# Patient Record
Sex: FEMALE | Race: WHITE | Employment: OTHER | URBAN - METROPOLITAN AREA
[De-identification: names, ages, dates, MRNs, and addresses within clinical notes are randomized per-mention and may not be internally consistent; named-entity substitution may affect disease eponyms.]

---

## 2017-04-22 ENCOUNTER — TELEPHONE (OUTPATIENT)
Dept: FAMILY MEDICINE | Facility: CLINIC | Age: 37
End: 2017-04-22

## 2017-04-22 NOTE — TELEPHONE ENCOUNTER
Panel Management Review      Patient has the following on her problem list:     Asthma review     ACT Total Scores 8/29/2016   ACT TOTAL SCORE (Goal Greater than or Equal to 20) 9   In the past 12 months, how many times did you visit the emergency room for your asthma without being admitted to the hospital? 0   In the past 12 months, how many times were you hospitalized overnight because of your asthma? 0      1. Is Asthma diagnosis on the Problem List? Yes    2. Is Asthma listed on Health Maintenance? Yes    3. Patient is due for:  ACT and AAP      Composite cancer screening  Chart review shows that this patient is due/due soon for the following Pap Smear  Summary:    Patient is due/failing the following:   AAP, ACT, PAP and PHYSICAL    Action needed:   Patient needs office visit for Physical with pap,     Type of outreach:    Phone, left message for patient to call back.     Questions for provider review:    None                                                                                                                                    Theodore Chery CMA       Chart routed to Care Team .

## 2017-04-22 NOTE — LETTER
[facility]  [address] [address]  [phone]  May 5, 2017    Tosha Mcnair  [address] [address] [address]    Dear Tosha,    I care about your health and have reviewed your health plan. I have reviewed your medical conditions, medication list, and lab results and am making recommendations based on this review, to better manage your health.    You are in particular need of attention regarding:  -Asthma  -Cervical Cancer Screening    I am recommending that you:  :-schedule a WELLNESS (Physical) APPOINTMENT with me.   I will check fasting labs the same day - nothing to eat except water and meds for 8-10 hours prior.  -schedule a PAP SMEAR EXAM which is due.  Please disregard this reminder if you have had this exam elsewhere within the last year.  It would be helpful for us to have a copy of your recent pap smear report in our file so that we can best coordinate your care.    Here is a list of Health Maintenance topics that are due now or due soon:  Health Maintenance Due   Topic Date Due     ASTHMA ACTION PLAN Q1 YR (NO INBASKET)  02/20/1984     PAP SCREENING Q3 YR (SYSTEM ASSIGNED)  02/20/2000     TETANUS IMMUNIZATION (SYSTEM ASSIGNED)  09/01/2016     ASTHMA CONTROL TEST Q6 MOS (NO INBASKET)  02/28/2017       Please call us at [phone] (or use [employer]) to address the above recommendations.     Thank you for trusting [facility] and we appreciate the opportunity to serve you.  We look forward to supporting your healthcare needs in the future.    Healthy Regards,  Lady Garcia MD

## 2017-05-05 NOTE — TELEPHONE ENCOUNTER
Tried calling patient, phone is not accepting calls. Will send letter.  Theodore Chery [facility]

## 2017-06-28 ENCOUNTER — NURSE TRIAGE (OUTPATIENT)
Dept: NURSING | Facility: CLINIC | Age: 37
End: 2017-06-28

## 2017-06-28 NOTE — TELEPHONE ENCOUNTER
Additional Information    Negative: Shock suspected (e.g., cold/pale/clammy skin, too weak to stand, low BP, rapid pulse)    Negative: Passed out (i.e., lost consciousness, collapsed and was not responding)    Negative: Sounds like a life-threatening emergency to the triager    Negative: Menstrual cramps is main concern    Negative: Abdominal (stomach) pain is main concern    Negative: Vulvar (external genital area) pain or symptoms (e.g., dryness, sores, redness, blisters, bumps) is main concern    Negative: Rectal pain is main concern    Negative: Hip pain is main concern    Negative: Urination pain is main concern (pain with passing urine)    Negative: [1] Pelvic pain AND [2] pregnant < 20 weeks    Negative: [1] Pelvic pain AND [2] pregnant > 20 weeks    Negative: [1] Pelvic pain AND [2] postpartum < 1 month since delivery    Negative: Pain associated with known hernia or new-onset hernia suspected (reducible bulge in groin/abdomen)    Negative: Followed an abdomen (stomach) injury    Negative: Followed a genital area injury    Negative: [1] SEVERE pelvic pain (e.g., excruciating) AND [2] vomiting    Negative: [1] SEVERE pelvic pain AND [2] present > 1 hour    Negative: SEVERE vaginal bleeding (i.e., soaking 2 pads or tampons per hour and present 2 or more hours)    Negative: Patient sounds very sick or weak to the triager    Negative: [1] MILD-MODERATE pain AND [2] constant AND [3] present > 2 hours    Negative: Fever > 103 F (39.4 C)    Negative: [1] Fever > 101 F (38.3 C) AND [2] age > 60    Negative: [1] Fever > 101 F (38.3 C) AND [2] bedridden (e.g., nursing home patient, CVA,chronic illness, recovering from surgery)    Negative: [1] Fever > 100.5 F (38.1 C) AND [2] diabetes mellitus or weak immune system(e.g., HIV positive, cancer chemo, splenectomy, organ transplant, chronicsteroids)    Negative: [1] MODERATE (e.g., interferes with normal activities) pelvic pain AND [2] pain comes and goes (cramps) AND  [3] present > 24 hours    Negative: [1] MILD (e.g., does not interfere with normal activities) pelvic pain AND [2] pain comes and goes (cramps) AND [3] present > 48 hours    Negative: Pregnancy suspected (e.g., missed last menstrual period)    Negative: Unusual vaginal discharge (e.g., bad smelling, yellow, green, or foamy-white)    Negative: Blood in urine (red, pink, or tea-colored)    Negative: [1] Pain with sexual intercourse (dyspareunia) AND [2] new onset (in past 4 weeks)    Negative: Patient is worried about sexually transmitted disease (STD / STI)    Negative: [1] Pelvic pain is a chronic symptom (recurrent or ongoing AND [2] present > 4 weeks)    Negative: [1] Pain with sexual intercourse (dyspareunia)  is a chronic symptom (recurrent or ongoing AND [2] present > 4 weeks)    Negative: [1] MILD-MODERATE pelvic pain AND [2] occurs in the middle of menstrual cycle (2 weeks after first day of period) (all triage questions negative)    [1] MILD-MODERATE pelvic pain AND [2] constant and [3] present < 2 hours (all triage questions negative)    Protocols used: PELVIC PAIN - FEMALE-ADULT-

## 2017-08-03 ENCOUNTER — TELEPHONE (OUTPATIENT)
Dept: FAMILY MEDICINE | Facility: CLINIC | Age: 37
End: 2017-08-03

## 2017-08-03 NOTE — LETTER
[facility]  [address] [address]  [phone]  August 3, 2017    Tosha Mcnair  [address] [address] [address]    Dear Tosha,    I care about your health and have reviewed your health plan. I have reviewed your medical conditions, medication list, and lab results and am making recommendations based on this review, to better manage your health.    You are in particular need of attention regarding:  -Asthma    I am recommending that you:  {recommendations: -Complete and return the attached ASTHMA CONTROL TEST.  If your total score is 19 or less or you have been to the ER or urgent care for your asthma, then please schedule an asthma followup appointment.    Here is a list of Health Maintenance topics that are due now or due soon:  Health Maintenance Due   Topic Date Due     ASTHMA ACTION PLAN Q1 YR  02/20/1984     TETANUS IMMUNIZATION (SYSTEM ASSIGNED)  09/01/2016     ASTHMA CONTROL TEST Q6 MOS  02/28/2017       Please call us at [phone] (or use [employer]) to address the above recommendations.     Thank you for trusting [facility] and we appreciate the opportunity to serve you.  We look forward to supporting your healthcare needs in the future.    Healthy Regards,    Lady Garcia MD/adrian

## 2017-08-03 NOTE — TELEPHONE ENCOUNTER
Panel Management Review      Patient has the following on her problem list:     Asthma review     ACT Total Scores 8/29/2016   ACT TOTAL SCORE (Goal Greater than or Equal to 20) 9   In the past 12 months, how many times did you visit the emergency room for your asthma without being admitted to the hospital? 0   In the past 12 months, how many times were you hospitalized overnight because of your asthma? 0      1. Is Asthma diagnosis on the Problem List? Yes    2. Is Asthma listed on Health Maintenance? Yes    3. Patient is due for:  ACT        Composite cancer screening  Chart review shows that this patient is due/due soon for the following None  Summary:    Patient is due/failing the following:   ACT    Action needed:   Patient needs to do ACT.    Type of outreach:    Copy of ACT mailed to patient, will reach out in 5 days. and current number listed is a non-working number    Questions for provider review:    None                                                                                                                                    Gwendolyn Ramos/[facility]       Chart routed to Care Team .

## 2019-03-01 ENCOUNTER — HOSPITAL ENCOUNTER (INPATIENT)
Facility: CLINIC | Age: 39
LOS: 31 days | Discharge: HOME | DRG: 885 | End: 2019-04-01
Attending: PSYCHIATRY & NEUROLOGY | Admitting: PSYCHIATRY & NEUROLOGY
Payer: COMMERCIAL

## 2019-03-01 ENCOUNTER — HOSPITAL ENCOUNTER (EMERGENCY)
Facility: CLINIC | Age: 39
Discharge: PSYCHIATRIC OR SUBSTANCE USE FACILITY | End: 2019-03-01
Attending: EMERGENCY MEDICINE | Admitting: EMERGENCY MEDICINE
Payer: COMMERCIAL

## 2019-03-01 VITALS
OXYGEN SATURATION: 99 % | DIASTOLIC BLOOD PRESSURE: 73 MMHG | RESPIRATION RATE: 18 BRPM | SYSTOLIC BLOOD PRESSURE: 119 MMHG | WEIGHT: 189 LBS | BODY MASS INDEX: 27.06 KG/M2 | HEIGHT: 70 IN | TEMPERATURE: 98.4 F

## 2019-03-01 DIAGNOSIS — F29 PSYCHOSIS, UNSPECIFIED PSYCHOSIS TYPE (H): ICD-10-CM

## 2019-03-01 DIAGNOSIS — F23 ACUTE PSYCHOSIS (H): Primary | ICD-10-CM

## 2019-03-01 DIAGNOSIS — F31.2 BIPOLAR AFFECTIVE DISORDER, CURRENTLY MANIC, SEVERE, WITH PSYCHOTIC FEATURES (H): ICD-10-CM

## 2019-03-01 PROCEDURE — 99285 EMERGENCY DEPT VISIT HI MDM: CPT | Mod: 25

## 2019-03-01 PROCEDURE — 90791 PSYCH DIAGNOSTIC EVALUATION: CPT

## 2019-03-01 PROCEDURE — 12400001 ZZH R&B MH UMMC

## 2019-03-01 RX ORDER — OLANZAPINE 10 MG/1
10 TABLET ORAL
Status: DISCONTINUED | OUTPATIENT
Start: 2019-03-01 | End: 2019-04-01 | Stop reason: HOSPADM

## 2019-03-01 RX ORDER — ALBUTEROL SULFATE 90 UG/1
2 AEROSOL, METERED RESPIRATORY (INHALATION) EVERY 6 HOURS PRN
Status: DISCONTINUED | OUTPATIENT
Start: 2019-03-01 | End: 2019-04-01 | Stop reason: HOSPADM

## 2019-03-01 RX ORDER — MONTELUKAST SODIUM 10 MG/1
10 TABLET ORAL AT BEDTIME
Status: DISCONTINUED | OUTPATIENT
Start: 2019-03-01 | End: 2019-03-02

## 2019-03-01 RX ORDER — OLANZAPINE 10 MG/2ML
10 INJECTION, POWDER, FOR SOLUTION INTRAMUSCULAR
Status: DISCONTINUED | OUTPATIENT
Start: 2019-03-01 | End: 2019-04-01 | Stop reason: HOSPADM

## 2019-03-01 RX ORDER — ALUMINA, MAGNESIA, AND SIMETHICONE 2400; 2400; 240 MG/30ML; MG/30ML; MG/30ML
30 SUSPENSION ORAL EVERY 4 HOURS PRN
Status: DISCONTINUED | OUTPATIENT
Start: 2019-03-01 | End: 2019-04-01 | Stop reason: HOSPADM

## 2019-03-01 RX ORDER — MULTIPLE VITAMINS W/ MINERALS TAB 9MG-400MCG
1 TAB ORAL DAILY
Status: DISCONTINUED | OUTPATIENT
Start: 2019-03-02 | End: 2019-03-05

## 2019-03-01 RX ORDER — HYDROXYZINE HYDROCHLORIDE 25 MG/1
25 TABLET, FILM COATED ORAL EVERY 4 HOURS PRN
Status: DISCONTINUED | OUTPATIENT
Start: 2019-03-01 | End: 2019-04-01 | Stop reason: HOSPADM

## 2019-03-01 RX ORDER — FLUTICASONE PROPIONATE 110 UG/1
2 AEROSOL, METERED RESPIRATORY (INHALATION) 2 TIMES DAILY
Status: DISCONTINUED | OUTPATIENT
Start: 2019-03-01 | End: 2019-03-01 | Stop reason: CLARIF

## 2019-03-01 ASSESSMENT — ACTIVITIES OF DAILY LIVING (ADL)
TRANSFERRING: 0-->INDEPENDENT
DRESS: 0-->INDEPENDENT
BATHING: 0-->INDEPENDENT
SWALLOWING: 0-->SWALLOWS FOODS/LIQUIDS WITHOUT DIFFICULTY
AMBULATION: 0-->INDEPENDENT
COGNITION: 0 - NO COGNITION ISSUES REPORTED
RETIRED_COMMUNICATION: 0-->UNDERSTANDS/COMMUNICATES WITHOUT DIFFICULTY
RETIRED_EATING: 0-->INDEPENDENT
FALL_HISTORY_WITHIN_LAST_SIX_MONTHS: YES
TOILETING: 0-->INDEPENDENT
NUMBER_OF_TIMES_PATIENT_HAS_FALLEN_WITHIN_LAST_SIX_MONTHS: 1

## 2019-03-01 ASSESSMENT — MIFFLIN-ST. JEOR: SCORE: 1607.55

## 2019-03-01 NOTE — ED NOTES
Bed: ED17  Expected date: 3/1/19  Expected time: 1:00 PM  Means of arrival:   Comments:  Radha [address]  43 F, HORTENSIA

## 2019-03-01 NOTE — ED PROVIDER NOTES
"  History     Chief Complaint:  \"severe paranoia\"    HPI   Tosha Mcnair is a 39 year old female with a history of anxiety, adjustment disorder, bipolar disorder, and asthma who presents to the emergency department today with \"severe paranoia.\"  According to the nurse's notes,    Arrives via EMS after  stopped by patient's house today.  Patient did not believe the  was who she said she was. Hx bipolar with psychotic features, PDD, last commitment ended 2/2018.  reports patient appears to be delusional about people who she reports are dead.  She has been sending 50+ emails that do not make sense to her house worker. She is not able to meet her basic needs due to mental health issues.  She was agitated and confused on scene.  Patient reported to  she is speaking a \"special language\" which makes no sense. EMS report patient has been cooperative, is cooperative at this time here in ED. Hx of autism as well. ABCD's intact; A/o x 4.     Patient herself only tells me that at someone came to her door today she did not recognize and so would not let them in.  She talks about the contract she has with her living facility, [facility] specifying that they should not answer for solicitor's.  She denies sending any unusual emails.  She denies feeling unusually worried.    Allergies:  Codeine  Morphine  Darvocet [Propoxyphene N-Apap]  Penicillins  Phenytoin  Sulfa Drugs    Medications:    Albuterol  Flovent   Lamictal  Singulair   Phentermine     Past Medical History:    Adjustment disorder   Anxiety disorder   Asthma   Bipolar disorder  Cervical intraepithelial neoplasia   Mild mental retardation     Past Surgical History:    Hysterectomy vaginal    Family History:    Hypertension   Diabetes gastrointestinal disease    Social History:  The patient was accompanied to the ED by alone.  Smoking Status: Never smoker   Smokeless Tobacco: Never used   Alcohol Use: Yes 3 glasses of " "wine per week   Drug use: No  Marital Status:       Review of Systems   All other systems reviewed and are negative.      Physical Exam   First Vitals:  Patient Vitals for the past 24 hrs:   BP Temp Temp src Heart Rate Resp SpO2 Height Weight   03/01/19 1312 119/73 98.4  F (36.9  C) Oral 83 18 99 % 1.778 m (5' 10\") 85.7 kg (189 lb)     Physical Exam   Constitutional:   Pleasant and cooperative   HENT:   Right Ear: Tympanic membrane normal.   Left Ear: Tympanic membrane normal.   Mouth/Throat: Oropharynx is clear and moist and mucous membranes are normal. No posterior oropharyngeal erythema.   Eyes: Conjunctivae are normal.   Neck: Neck supple.   Cardiovascular: Normal rate, regular rhythm and normal heart sounds.   Pulmonary/Chest: Effort normal and breath sounds normal.   Abdominal: Soft. Bowel sounds are normal. She exhibits no distension. There is no tenderness. There is no rebound and no guarding.   Musculoskeletal: Normal range of motion.   Neurological: She is alert.   Skin: Skin is warm and dry.   Psychiatric: She expresses no homicidal and no suicidal ideation.       Emergency Department Course   Laboratory:  Laboratory findings were communicated with the patient who voiced understanding of the findings.      Interventions:    Medications - No data to display     Emergency Department Course:  Nursing notes and vitals reviewed.  1405: I performed an exam of the patient as documented above.         Impression & Plan    Medical Decision Making:    Tosha Mcnair is a 39 year old female who arrives in the emergency department by ambulance after being summoned by [facility].  The patient presents here seeming fairly stable, denying any obvious paranoid delusions and denying any suicidal or homicidal ideation.  I contacted our mental health worker from [place] who was able to contact the [facility] crisis worker who had called the ambulance for the patient.  She was able to give a much more " detailed history about the patient's increasing paranoid and psychotic behavior, refusing intervention.  The person who came to her door today was actually her  and not a stranger.  In addition, the patient told the  that both she herself and the  were actually dead, explaining that she herself had been murdered last year.  Under the circumstances, I feel the patient is unable to protect her own welfare in the community and will require hospitalization.  Intake is working on a mental health bed.      Diagnosis:    ICD-10-CM    1. Psychosis, unspecified psychosis type (H) F29        Disposition:  Transfer to mental health    Discharge Medications:     Medication List      There are no discharge medications for this visit.       Jarek Francis  3/1/2019   [facility] EMERGENCY DEPARTMENT  Scribe Disclosure:  I, Jarek Francis, am serving as a scribe at 3:15 PM on 3/1/2019 to document services personally performed by Kaylee Husain MD based on my observations and the provider's statements to me.        Kaylee Husain MD  03/19/19 0002

## 2019-03-01 NOTE — ED TRIAGE NOTES
"Arrives via EMS after  stopped by patient's house today.  Patient did not believe the  was who she said she was. Hx bipolar with psychotic features, PDD, last commitment ended 2/2018.  reports patient appears to be delusional about people who she reports are dead.  She has been sending 50+ emails that do not make sense to her house worker. She is not able to meet her basic needs due to mental health issues.  She was agitated and confused on scene.  Patient reported to  she is speaking a \"special language\" which makes no sense. EMS report patient has been cooperative, is cooperative at this time here in ED. Hx of autism as well. ABCD's intact; A/o x 4.   "

## 2019-03-01 NOTE — ED PROVIDER NOTES
Care started for oncoming doctor.   HORTENSIA hold placed until more information obtained as concern about ability to care for self.      Shayne Robins MD  03/01/19 9578

## 2019-03-02 LAB
ALBUMIN SERPL-MCNC: 3.5 G/DL (ref 3.4–5)
ALP SERPL-CCNC: 89 U/L (ref 40–150)
ALT SERPL W P-5'-P-CCNC: 19 U/L (ref 0–50)
ANION GAP SERPL CALCULATED.3IONS-SCNC: 8 MMOL/L (ref 3–14)
AST SERPL W P-5'-P-CCNC: 5 U/L (ref 0–45)
BASOPHILS # BLD AUTO: 0 10E9/L (ref 0–0.2)
BASOPHILS NFR BLD AUTO: 0.2 %
BILIRUB SERPL-MCNC: 0.9 MG/DL (ref 0.2–1.3)
BUN SERPL-MCNC: 10 MG/DL (ref 7–30)
CALCIUM SERPL-MCNC: 8.4 MG/DL (ref 8.5–10.1)
CHLORIDE SERPL-SCNC: 107 MMOL/L (ref 94–109)
CHOLEST SERPL-MCNC: 159 MG/DL
CO2 SERPL-SCNC: 27 MMOL/L (ref 20–32)
CREAT SERPL-MCNC: 0.71 MG/DL (ref 0.52–1.04)
DIFFERENTIAL METHOD BLD: NORMAL
EOSINOPHIL # BLD AUTO: 0.2 10E9/L (ref 0–0.7)
EOSINOPHIL NFR BLD AUTO: 2.3 %
ERYTHROCYTE [DISTWIDTH] IN BLOOD BY AUTOMATED COUNT: 13.6 % (ref 10–15)
GFR SERPL CREATININE-BSD FRML MDRD: >90 ML/MIN/{1.73_M2}
GLUCOSE SERPL-MCNC: 107 MG/DL (ref 70–99)
HCT VFR BLD AUTO: 38.8 % (ref 35–47)
HDLC SERPL-MCNC: 40 MG/DL
HGB BLD-MCNC: 12.7 G/DL (ref 11.7–15.7)
IMM GRANULOCYTES # BLD: 0 10E9/L (ref 0–0.4)
IMM GRANULOCYTES NFR BLD: 0.1 %
LDLC SERPL CALC-MCNC: 93 MG/DL
LYMPHOCYTES # BLD AUTO: 1.9 10E9/L (ref 0.8–5.3)
LYMPHOCYTES NFR BLD AUTO: 20.5 %
MCH RBC QN AUTO: 28.9 PG (ref 26.5–33)
MCHC RBC AUTO-ENTMCNC: 32.7 G/DL (ref 31.5–36.5)
MCV RBC AUTO: 88 FL (ref 78–100)
MONOCYTES # BLD AUTO: 0.4 10E9/L (ref 0–1.3)
MONOCYTES NFR BLD AUTO: 4.8 %
NEUTROPHILS # BLD AUTO: 6.6 10E9/L (ref 1.6–8.3)
NEUTROPHILS NFR BLD AUTO: 72.1 %
NONHDLC SERPL-MCNC: 119 MG/DL
NRBC # BLD AUTO: 0 10*3/UL
NRBC BLD AUTO-RTO: 0 /100
PLATELET # BLD AUTO: 278 10E9/L (ref 150–450)
POTASSIUM SERPL-SCNC: 3.5 MMOL/L (ref 3.4–5.3)
PROT SERPL-MCNC: 7.1 G/DL (ref 6.8–8.8)
RBC # BLD AUTO: 4.39 10E12/L (ref 3.8–5.2)
SODIUM SERPL-SCNC: 142 MMOL/L (ref 133–144)
TRIGL SERPL-MCNC: 129 MG/DL
TSH SERPL DL<=0.005 MIU/L-ACNC: 2.49 MU/L (ref 0.4–4)
WBC # BLD AUTO: 9.1 10E9/L (ref 4–11)

## 2019-03-02 PROCEDURE — 82306 VITAMIN D 25 HYDROXY: CPT | Performed by: STUDENT IN AN ORGANIZED HEALTH CARE EDUCATION/TRAINING PROGRAM

## 2019-03-02 PROCEDURE — 85025 COMPLETE CBC W/AUTO DIFF WBC: CPT | Performed by: STUDENT IN AN ORGANIZED HEALTH CARE EDUCATION/TRAINING PROGRAM

## 2019-03-02 PROCEDURE — 80053 COMPREHEN METABOLIC PANEL: CPT | Performed by: STUDENT IN AN ORGANIZED HEALTH CARE EDUCATION/TRAINING PROGRAM

## 2019-03-02 PROCEDURE — 12400001 ZZH R&B MH UMMC

## 2019-03-02 PROCEDURE — 80175 DRUG SCREEN QUAN LAMOTRIGINE: CPT | Performed by: STUDENT IN AN ORGANIZED HEALTH CARE EDUCATION/TRAINING PROGRAM

## 2019-03-02 PROCEDURE — 99223 1ST HOSP IP/OBS HIGH 75: CPT | Mod: AI | Performed by: PSYCHIATRY & NEUROLOGY

## 2019-03-02 PROCEDURE — 36415 COLL VENOUS BLD VENIPUNCTURE: CPT | Performed by: STUDENT IN AN ORGANIZED HEALTH CARE EDUCATION/TRAINING PROGRAM

## 2019-03-02 PROCEDURE — 80061 LIPID PANEL: CPT | Performed by: STUDENT IN AN ORGANIZED HEALTH CARE EDUCATION/TRAINING PROGRAM

## 2019-03-02 PROCEDURE — 80307 DRUG TEST PRSMV CHEM ANLYZR: CPT | Performed by: STUDENT IN AN ORGANIZED HEALTH CARE EDUCATION/TRAINING PROGRAM

## 2019-03-02 PROCEDURE — 84443 ASSAY THYROID STIM HORMONE: CPT | Performed by: STUDENT IN AN ORGANIZED HEALTH CARE EDUCATION/TRAINING PROGRAM

## 2019-03-02 ASSESSMENT — ACTIVITIES OF DAILY LIVING (ADL)
ORAL_HYGIENE: INDEPENDENT
DRESS: INDEPENDENT;STREET CLOTHES
HYGIENE/GROOMING: INDEPENDENT

## 2019-03-02 NOTE — PROGRESS NOTES
Initial Psychosocial Assessment    I have reviewed the chart, met with the patient, and developed Care Plan. Information for assessment was obtained from: Pt, medical record      Presenting Problem:   Patient was placed on a transport hold by [facility] Crisis team after  had safety concerns about pt. The crisis team found pt to be paranoid and displaying disorganized thinking. Pt believes she was murdered in 2017.      History of Mental Health and Chemical Dependency:  [facility]      Significant Life Events   (Illness, Abuse, Trauma, Death): none    Family: raised in [place] with intact family, has two younger siblings,  with three children    Living Situation: being evicted in April per chart notes      Educational Background: HS grad, some business classes       Financial Status: SSDI    Legal Issues:  none    Ethnic/Cultural Issues: no issues    Spiritual Orientation:  Yazidism, Yarsani and Druze     Service History: none    Social Functioning (organization, interests): talking with family and friends    Current Treatment Providers are:  Dr. Rebolledo at [facility] in [facility] CRISTINE Delcid [phone]      Social Service Assessment/Plan:   Provide a psychological assessment and manage medications per psychiatry. CTC to contact CM for additional information and meet with patient to discuss discharge planning. Staff to provide safe environment and observe for behavior changes.

## 2019-03-02 NOTE — PROGRESS NOTES
"   03/02/19 1420   Values Beliefs and Spiritual Care   C: Community: In support of your spiritual health, is there someone we may contact for you? (identify all that apply) ([facility] 3/2)   Visit Information   Visit Made By Staff    Type of Visit On-call   Visited Patient   Interventions   Basic Spiritual Interventions    introduction/orientation to Spiritual Health Services;Assessment of spiritual needs/resources;Devotional reading   Advanced Assessments/Interventions   Presenting Concerns/Issues Spiritual/Baptism/emotional support   SPIRITUAL HEALTH SERVICES   Spiritual Assessment Progress Note (Behavioral Health/CD Focus)  [facility]    REFERRAL SOURCE: pt request for  support on admission.    On this visit, I checked in with staff and met with Tosha in her room for about 10 minutes.    EXPERIENCE OF ILLNESS/HOSPITALIZATION:  Tosha noted that she does not like it when we (staff) ask too many questions, and asked about the role of the  in the hospital. She welcomed the suggestion of reading some scripture for her (I shared some from Psalm 131, and 121)    MEANING-MAKING:      SPIRITUALITY/VALUES/Voodoo:  Tosha had a copy of the NT & Psalms that our dept provides open on her bed; she stated \"I have been reading all night.\" She shared about her beliefs in God and Lords (clarified as Czech, Theodore, and other gods of literature), and noted a particualr web site she finds meaningful - quoting at some length some of the wisdom/poetry written there.     COPING/SPIRITUAL PRACTICES:     SUPPORT SYSTEMS:     PLAN: Tosha welcomes further support from the unit .                                                                                                                                               Kymberly Yung MDiv    Pager [phone]      "

## 2019-03-02 NOTE — PROGRESS NOTES
Pt isolative to room where she reads.  Reports doing fine.  Declined going to group.  Ate meals in her room.  Delusional about reason for admission.  Appears a little irritable but cooperative.  Denies hallucinations and SI/SIB.       03/02/19 1400   Behavioral Health   Hallucinations denies / not responding to hallucinations   Thinking delusional;paranoid   Orientation person: oriented;place: oriented;date: oriented;time: oriented   Memory other (see comment)  (ELENI)   Insight poor   Judgement impaired   Eye Contact at examiner   Affect tense   Mood irritable   Physical Appearance/Attire attire appropriate to age and situation   Suicidality other (see comments)  (denies)   1. Wish to be Dead No   2. Non-Specific Active Suicidal Thoughts  No   Self Injury other (see comment)  (denies)   Activity isolative;withdrawn   Speech clear;coherent   Medication Sensitivity no stated side effects;no observed side effects   Psychomotor / Gait balanced;steady   Psycho Education   Type of Intervention 1:1 intervention   Response participates, initiates socially appropriate   Hours 0.5   Treatment Detail check in   Activities of Daily Living   Hygiene/Grooming independent   Oral Hygiene independent   Dress independent;street clothes   Room Organization independent   Behavioral Health Interventions   Social and Therapeutic Interventions (Psychotic Symptoms) encourage socialization with peers;encourage participation in therapeutic groups and milieu activities

## 2019-03-02 NOTE — PROGRESS NOTES
03/01/19 2224   Patient Belongings   Did you bring any home meds/supplements to the hospital?  Yes   Disposition of meds  Sent to security/pharmacy per site process   Patient Belongings locker   Patient Belongings Put in Hospital Secure Location (Security or Locker, etc.) cash/credit card;cell phone/electronics;clothing;keys;necklace;purse/wallet;wallet;shoes;ring   Belongings Search Yes   Clothing Search Yes       Pt has in their locker black winter boots, a dark gray and pink winter jacket, black purse, cross necklace, silver ring,  black Ashwini's secret clutch, miscellaneous coupons, papers, and gift cards, keys, cell phone , white headphones, black smart phone, [place] ID, empty bottle of Vitamin D2.    Sent to security in envelope [id number]:  Bottle of Phentermine, $85 cash, EBT card ending 4151, Firefly Visa ending 2948, Social security card ending [id number]     A               Admission:  I am responsible for any personal items that are not sent to the safe or pharmacy.  [facility] is not responsible for loss, theft or damage of any property in my possession.    Signature:  _________________________________ Date: _______  Time: _____                                              Staff Signature:  ____________________________ Date: ________  Time: _____      2nd Staff person, if patient is unable/unwilling to sign:    Signature: ________________________________ Date: ________  Time: _____     Discharge:  [facility] has returned all of my personal belongings:    Signature: _________________________________ Date: ________  Time: _____                                          Staff Signature:  ____________________________ Date: ________  Time: _____

## 2019-03-02 NOTE — PROGRESS NOTES
Pt was interviewed by nurses at start of shift in interview room, after completion of interview pt went to her room and laid down in bed. Writer observed pt laying in bed, waving her arms around in air. Pt would often look up and appear to wave at writer if she noticed writer checking in. Pt slept for a short period around 0030 (about 1 hr) but woke again and was observed waving arms in air again and shifting in bed. Writer occasionally observed pt sitting up in bed and coming out of room briefly, but pt remained mostly in her room during night. Pt slept very sparingly after waking, and was awake most of the night.    Pt slept 2.5 hrs

## 2019-03-02 NOTE — PROGRESS NOTES
"Admission interview:    Patient is calm and willing to speak with this writer. Pt is on a 72 hour hold. She believes she is here, because \"two women came to her door claiming to be my caseworkers and I did not recognize them so they called the paramedics on me. I'm here because they think I am paranoid.\" Pt claimed to speak many languages but she gave an example of jibberish and it was not a part of the language. She used long numbers to describe her knowledge on topics. For example, \"I've had autism since I was a kid. I have autism [id number]. Look it up.\" Pt has a balaji on her forehead, she states \"it's from hitting my head on headboard when caseworkers woke me up.\" Reported marks on neck are from spider bite and she took benadryl before admission.     Denies SI/SIB/hallucinations and HI. Pt states she is not depressed and that everything is fine. Pt did not appear lucid and information gathered for admission may not be accurate. Bill of Rights given. Denies any SE's or acute physical distress.   "

## 2019-03-02 NOTE — ED NOTES
Explained pt about her hold status, and they are trying to find placement.  Pt very argumentative, citing state statutes about her hold status, and denies she is paranoid.
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
## 2019-03-02 NOTE — PROGRESS NOTES
"  Patient 39 year old female, transferred to station 22 from [facility] under 72 Hour Hold.  Patient has history of bipolar with psychotic features as well as autism.   Pt sent by YEOXIN VMallMaynor Morgan for paranoia and delusional thoughts. (ie. Pt believes she was murdered in 2012).   reports pt not caring for self.  Pt also refused to sign LIBIA to .   Pt has reorted asthma; albeit, pt denies.   Pt refused to interview with \"male nurse\", and refused HS meds. \"I don't take Singular.   I don't take any inhaler.  You got the wrong person!\"   Pt cooperated with search, but is guarded.  Pt denies thoughts of self harm.  "

## 2019-03-03 LAB — LAMOTRIGINE SERPL-MCNC: <0.9 UG/ML (ref 2.5–15)

## 2019-03-03 PROCEDURE — 12400001 ZZH R&B MH UMMC

## 2019-03-03 ASSESSMENT — ACTIVITIES OF DAILY LIVING (ADL)
HYGIENE/GROOMING: INDEPENDENT
LAUNDRY: WITH SUPERVISION
ORAL_HYGIENE: INDEPENDENT
ORAL_HYGIENE: INDEPENDENT
DRESS: INDEPENDENT
HYGIENE/GROOMING: INDEPENDENT
DRESS: INDEPENDENT

## 2019-03-03 NOTE — PLAN OF CARE
Tosha spent most of day in her room-eats meals over long period of time while sitting on bed moving hands and arms through air in ritualistic manner-also noted to touch name tag near door whenever entering room-became angry when urine specimen requested insisting RN discussed her hysterectomy with her two days ago and should know not to request  HCG (this RN not working 2 days ago)-very suspicious and accusatory insisting something inappropriate was happening-sarcastic and irritable-declines medications-

## 2019-03-03 NOTE — PROVIDER NOTIFICATION
Pt spent almost entire shift in room, flicking wrist at walls as if holding an aspergillum.   Pt did laundry and attended dinner, but minimal interaction with peers.

## 2019-03-03 NOTE — PROGRESS NOTES
Pt continues to refuse PRNs for sleep. Pt has been awake resting in bed since 0345. Slept for a total of 4.25 hours with regular non-labored respirations. No observed distress.

## 2019-03-04 LAB — DEPRECATED CALCIDIOL+CALCIFEROL SERPL-MC: 28 UG/L (ref 20–75)

## 2019-03-04 PROCEDURE — 99232 SBSQ HOSP IP/OBS MODERATE 35: CPT | Mod: GC | Performed by: PSYCHIATRY & NEUROLOGY

## 2019-03-04 PROCEDURE — 12400001 ZZH R&B MH UMMC

## 2019-03-04 ASSESSMENT — ACTIVITIES OF DAILY LIVING (ADL)
HYGIENE/GROOMING: INDEPENDENT
DRESS: INDEPENDENT
ORAL_HYGIENE: INDEPENDENT
LAUNDRY: WITH SUPERVISION

## 2019-03-04 NOTE — PLAN OF CARE
BEHAVIORAL TEAM DISCUSSION    Participants: Dr. Arden March PGY-1  [facility]  Progress: Initiated with hospitalization   Continued Stay Criteria/Rationale: Placed on a 72 hour hold due to concerns of paranoia and recent decompensation.  Medical/Physical: see psychiatry physician progress note for further details   Precautions:   Behavioral Orders   Procedures    Code 1 - Restrict to Unit    Elopement precautions    Routine Programming     As clinically indicated    Self Injury Precaution    Single Room    Status 15     Every 15 minutes.    Suicide precautions     Patients on Suicide Precautions should have a Combination Diet ordered that includes a Diet selection(s) AND a Behavioral Tray selection for Safe Tray - with utensils, or Safe Tray - NO utensils       Plan: Gather further collateral information from outpatient team to inform appropriate care. A petition for commitment will be filed with [facility] as patient has not been able to care for herself.   Rationale for change in precautions or plan: No change has been indicated.

## 2019-03-04 NOTE — PROGRESS NOTES
Pt was awake at the start of the night, observed by writer laying in bed waving arms in air--pt appeared to fall asleep around 0030. Pt was observed awake again around 0500 tossing/turning in bed and did not fall back asleep, waved at staff when they performed 15 min checks.     Pt slept for 4.25 hrs

## 2019-03-04 NOTE — PROGRESS NOTES
"    ----------------------------------------------------------------------------------------------------------  [facility], [place]   Psychiatric Progress Note  Hospital Day #3     Interim History:   The patient's care was discussed with the treatment team and chart notes were reviewed.    Sleep:  4.25 h  Scheduled Medications: None   Staff Report: Pt was awake at the start of the night, laying in bed waving arms in air--pt appeared to fall asleep around 0030, patient continued to be severely disorganized, paranoid.          Patient Interview: Patient was interviewed in her room by the team, patient was sitting on her bed eating her breakfast, when asked why she was brought to the hospital,she replied' People that I do not know were knocking on my door' Patient seemed very irritable, when asked about taking her medications, she reports that she has stopped taking her Lamictal abruptly 2 weeks ago prior to the admission citing that it was her outpatient psychiatrist fault  as she did not refilled her RX, when asked about her inability to clean her house based on the EMS reports, patient declined that her house was messy, she tells the writers ' I had visitors over the weekend \" I did not have time to clean the dishes, that is all'   Patient clearly has no insight to her current condition, when asked about taking the phentermine, She reports that she was cleared by her GI specialist to take it.      The risks, benefits, alternatives and side effects of any medication changes have been discussed and are understood by the patient and other caregivers.    Review of systems:     ROS was negative unless noted above.          Allergies:     Allergies   Allergen Reactions     Codeine Anaphylaxis     Morphine Anaphylaxis     Cardiac arrest     Darvocet [Propoxyphene N-Apap] Other (See Comments)     Affects kidneys     Divalproex Sodium-Fd&C Red #40 Hives and Swelling     Lithium Hives " "and Swelling     Penicillins Hives     Phenytoin Hives     Sulfa Drugs Hives     Also increases liver function     Topiramate Rash and Swelling            Psychiatric Examination:   /55   Pulse 68   Temp 97.4  F (36.3  C)   Resp 12   Wt 103 kg (227 lb)   SpO2 96%   BMI 32.57 kg/m    Weight is 227 lbs 0 oz  Body mass index is 32.57 kg/m .    Appearance:  awake, alert, Wearing personal clothes. Malodorous. Disheveled. Sitting on edge of radiator with feet on bed.  Attitude:  slightly  uncooperative  Eye Contact:  poor , intense   Mood:  \"irritable \"  Affect:  mood congruent  Speech: fast, pressured speech.   Psychomotor Behavior:  no evidence of tardive dyskinesia, dystonia, or tics  Thought Process:  disorganized, illogical and tangental  Associations:  no loose associations  Thought Content:  no evidence of suicidal ideation or homicidal ideation, no auditory hallucinations present and no visual hallucinations present Endorses grandiose and paranoid delusions.   Insight:  lack of insight.  Judgment:  poor  Oriented to:  time, person, and place  Attention Span and Concentration:  fair  Recent and Remote Memory:  fair  Language: Speaks English clearly and appropriately in casual context  Fund of Knowledge: delayed  Muscle Strength and Tone: appears normal  Gait and Station: Normal              Labs:   No results found for this or any previous visit (from the past 24 hour(s)).       Assessment    Diagnostic Impression:   Tosha Mcnair is a 39 year old female with a history of bipolar affective disorder, autism, unspecified anxiety, and mild intellectual disability by history who presented to the [facility] emergency department on a health and  hold with concern from [facility]  and [facility] crisis for paranoia, delusions, disorganized behavior, and inability to care for self in the context of likely medication nonadherence. The patient's last psychiatric hospitalization " was 2017 at [facility] where it appears she was placed under a commitment without Robledo which  in 2018.  Per chart review,  Dr. Rebolledo at [facility] is the outpatient psychiatric provider. There is genetic loading for mood, intellectual disability, autism. Current psychosocial stressors include possibly losing her apartment next month, SPMI, suspected medication nonadherence. The patient denies drug abuse or self injurious behaviors. The MSE is notable for a malodorous, disheveled woman who is calm with a euthymic affect. She endorses grandiose and paranoid delusions and demonstrates a disorganized, illogical thought process. The patient's presentation is most consistent with psychosis. However, she does not appear to be currently manic or depressed. This is therefore Psychosis NEC and requires further clarification.  I have concerns that use of phentermine may be contributing to possible psychosis, although medication adherence is questionable.          Hospital course: Tosha Mcnair was admitted to station 22 with  on 72 hours hold, on 3/4/18 civil committment petition  was filed giving the patient acute psychosis, PTA lamotrigine was not started given the fact that patient has stopped abruptly 2 weeks prior to admission, her Lamictal level on admission 3/2/19 < 0.9 mcg/l which confirms medication  non-adherence.         Medical course : Patient was cleared medically  by the ED physician for inpatient psychiatry admission.    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 Patient reports only taking Flovent and Singulair in the summer and does not want them given here.  -Continue PTA albuterol prn only     #Hyperlipidemia, by history: Low HDL of 40, otherwise unremarkable.      #Hepatitis C carrier, by history: On review of chart, HCV antibody negative in 2017 at [facility].  Check CMP in a.m., primary team may consider further workup, IM consult if needed     # Health Maintenance:  Mylanta PRN for indigestion  Milk of Magnesia PRN for constipation     Labs: TSH, CBC, BMP, Vitamin D all seems within normal limits.  - HCG qualitive test ( ordered on 3/4/19)  - Drug screen Blood ( ordered on 3/4/19)        Patient will be treated in therapeutic milieu with appropriate individual and group therapies as described.        Legal Status: 72-h Hold signed on 3/1/2019, Civil committment petition filed on 3/4/19.        Safety Assessment:   Behavioral Orders   Procedures     Code 1 - Restrict to Unit     Elopement precautions     Routine Programming     As clinically indicated     Self Injury Precaution     Single Room     Status 15     Every 15 minutes.     Suicide precautions     Patients on Suicide Precautions should have a Combination Diet ordered that includes a Diet selection(s) AND a Behavioral Tray selection for Safe Tray - with utensils, or Safe Tray - NO utensils         Disposition: unknown pending clinical stabilization and formulating safe discharge plans.    -------------------------------------------------------  Pt seen and discussed with my attending .     Jada March MD  Psychiatry PGY-1  [phone]    Psychiatry  Attending Attestation:  This patient has been seen and evaluated by me, Arden Stafford M.D.  The patient's condition and treatment plan were discussed with the resident, and care coordinated with the CTC and RN. I reviewed, edited and agree with the findings and plan in this note.     Arden Stafford M.D.   of Psychiatry
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
## 2019-03-04 NOTE — PROGRESS NOTES
Writer has called in petition for civil commitment into [facility] and a screener will be assigned to come interview patient.

## 2019-03-04 NOTE — PROGRESS NOTES
"Pt was observed mostly withdrawn to their room throughout the evening only coming out to walk the halls briefly. During check-in with writer pt denied all mental health symptoms. Pt stated that the spontaneous movements they make, such as flicking their wrist and arm, are exercises that they are doing. Pt stated that they met with a  and were told to make these movements to \"calm my soul\". They also went on to say that they have been making these movements since they were young. Pt expressed frustration as they feel they have not talked much with their doctors. Pt denies SI and SIB.     03/03/19 2047   Behavioral Health   Hallucinations appears responding   Thinking distractable;poor concentration   Orientation person: oriented;place: oriented;date: oriented   Memory confabulation   Insight poor   Judgement impaired   Eye Contact at examiner   Affect irritable;blunted, flat   Mood irritable;mood is calm   Physical Appearance/Attire disheveled   Hygiene neglected grooming - unclean body, hair, teeth   Suicidality (Pt denies)   1. Wish to be Dead No   2. Non-Specific Active Suicidal Thoughts  No   Self Injury (Pt denies)   Elopement Statements about wanting to leave   Activity isolative;withdrawn;hyperactive (agitated, impulsive)   Speech clear;coherent   Medication Sensitivity no stated side effects;no observed side effects   Psychomotor / Gait balanced;steady   Psycho Education   Type of Intervention 1:1 intervention   Response participates with encouragement   Hours 0.5   Treatment Detail Check-in   Activities of Daily Living   Hygiene/Grooming independent   Oral Hygiene independent   Dress independent   Room Organization independent     "

## 2019-03-05 LAB
ACETAMINOPHEN QUAL: NEGATIVE
AMOBARBITAL QUAL: NEGATIVE
BARBITAL QUAL: NEGATIVE
BUTABARBITAL QUAL: NEGATIVE
BUTALBITAL QUAL: NEGATIVE
CAFFEINE QUAL: NEGATIVE
CARBAMAZEPINE QUAL: NEGATIVE
CARISOPRODOL QUAL: NEGATIVE
CHLORPROPAMIDE UR-MCNC: NEGATIVE UG/ML
DRUGS SERPL SCN: NEGATIVE
ETHCLORVYNOL QUAL: NEGATIVE
ETHINAMATE QUAL: NEGATIVE
ETHOSUXIMIDE QUAL: NEGATIVE
ETHOTOIN QUAL: NEGATIVE
GLUTETHIMIDE QUAL: NEGATIVE
IBUPROFEN QUAL: NEGATIVE
MEPHENYTOIN QUAL: NEGATIVE
MEPHOBARBITAL QUAL: NEGATIVE
MEPROBAMATE QUAL: NEGATIVE
METHAQUALONE QUAL: NEGATIVE
METHARBITAL QUAL: NEGATIVE
METHSUXIMIDE QUAL: NEGATIVE
METHYPRYLON QUAL: NEGATIVE
PENTOBARBITAL QUAL: NEGATIVE
PHENACETIN QUAL: NEGATIVE
PHENOBARBITAL QUAL: NEGATIVE
PHENSUXIMIDE QUAL: NEGATIVE
PHENYTOIN QUAL: NEGATIVE
PRIMIDONE QUAL: NEGATIVE
SALICYLATE QUAL: NEGATIVE
SECOBARBITAL QUAL: NEGATIVE
TALBUTAL QUAL: NEGATIVE
THEOPHYLLINE QUAL: NEGATIVE
THIOPENTAL QUAL: NEGATIVE
TYBAMATE QUAL: NEGATIVE
VALPROIC ACID QUAL: NEGATIVE

## 2019-03-05 PROCEDURE — 12400001 ZZH R&B MH UMMC

## 2019-03-05 PROCEDURE — 25000132 ZZH RX MED GY IP 250 OP 250 PS 637: Performed by: PSYCHIATRY & NEUROLOGY

## 2019-03-05 PROCEDURE — 99232 SBSQ HOSP IP/OBS MODERATE 35: CPT | Mod: GC | Performed by: PSYCHIATRY & NEUROLOGY

## 2019-03-05 RX ORDER — RISPERIDONE 2 MG/1
2 TABLET ORAL AT BEDTIME
Status: DISCONTINUED | OUTPATIENT
Start: 2019-03-05 | End: 2019-03-12

## 2019-03-05 ASSESSMENT — ACTIVITIES OF DAILY LIVING (ADL)
LAUNDRY: UNABLE TO COMPLETE
DRESS: STREET CLOTHES;INDEPENDENT
HYGIENE/GROOMING: INDEPENDENT
HYGIENE/GROOMING: INDEPENDENT
ORAL_HYGIENE: INDEPENDENT
ORAL_HYGIENE: INDEPENDENT
DRESS: INDEPENDENT;SCRUBS (BEHAVIORAL HEALTH)

## 2019-03-05 NOTE — PLAN OF CARE
"\"I'm fine\". Patient denies depression, anxiety and all mental health symptoms.Does not attend groups, \"my  says the groups aren't for me\". Does not socialize with peers, \"they don't talk\". Patient refused to give a urine pregnancy specimen, \"I had a hysterectomy in 2012, they got all my labs a few days ago\" Writer told patient she would be meeting with her doctors today and patient said \"my own five doctors are coming in to see me today. I was suppose to transfer to the south part of the hospital\".  "

## 2019-03-05 NOTE — PROGRESS NOTES
----------------------------------------------------------------------------------------------------------  [facility], [place]   Psychiatric Progress Note  Hospital Day #4     Interim History:   The patient's care was discussed with the treatment team and chart notes were reviewed.    Sleep:  4.25 h  Scheduled Medications: None   Staff Report: Pt was awake at the start of the night, laying in bed waving arms in air--pt appeared to fall asleep around 0030, patient continued to be severely disorganized, paranoid.    Patient Interview: Patient was interviewed in her room by the team,Patient continued to be severely disorganized, irritable, when asked about her mood ' Okay' , patient is unable to engage in any conversation due to her ongoing  Psychosis, Patient tells the writer that her outpatient provider has called the unit at 11: 30 am and told her she can only take the lamictal,when the team tried to clarify with the patient what time her physician has called, she became extremely irritable and she started saying ' you can not give me any other medication, Not Seroquel, I have my wrights in [place] statue' and she went on saying numbers and unmeaning full words that the writer could not understand clearly.       The risks, benefits, alternatives and side effects of any medication changes have been discussed and are understood by the patient and other caregivers.    Review of systems:     ROS was negative unless noted above.          Allergies:     Allergies   Allergen Reactions     Codeine Anaphylaxis     Morphine Anaphylaxis     Cardiac arrest     Darvocet [Propoxyphene N-Apap] Other (See Comments)     Affects kidneys     Divalproex Sodium-Fd&C Red #40 Hives and Swelling     Lithium Hives and Swelling     Penicillins Hives     Phenytoin Hives     Sulfa Drugs Hives     Also increases liver function     Topiramate Rash and Swelling            Psychiatric Examination:   /79    "Pulse 77   Temp 98.7  F (37.1  C) (Oral)   Resp 16   Wt 102.3 kg (225 lb 8 oz)   SpO2 98%   BMI 32.36 kg/m    Weight is 225 lbs 8 oz  Body mass index is 32.36 kg/m .    Appearance:  awake, alert, Wearing personal clothes. Malodorous. Disheveled. Sitting on edge of radiator with feet on bed.  Attitude:  slightly  uncooperative  Eye Contact:  poor , intense   Mood:  \"irritable \"  Affect:  mood congruent  Speech: fast, pressured speech with unclear non meaningful  Words,  in the middle of the sentence that patient refer to as foreign language.  Psychomotor Behavior:  no evidence of tardive dyskinesia, dystonia, or tics  Thought Process:  disorganized, illogical and tangental  Associations:  no loose associations  Thought Content:  no evidence of suicidal ideation or homicidal ideation, no auditory hallucinations present and no visual hallucinations present Endorses grandiose and paranoid delusions.   Insight:  lack of insight.  Judgment:  poor  Oriented to:  time, person, and place  Attention Span and Concentration:  fair  Recent and Remote Memory:  fair  Language: Speaks English clearly and appropriately in casual context  Fund of Knowledge: delayed  Muscle Strength and Tone: appears normal  Gait and Station: Normal              Labs:   No results found for this or any previous visit (from the past 24 hour(s)).       Assessment    Diagnostic Impression:   Tosha Mcnair is a 39 year old female with a history of bipolar affective disorder, autism, unspecified anxiety, and mild intellectual disability by history who presented to the [facility] emergency department on a health and  hold with concern from [facility]  and [facility] crisis for paranoia, delusions, disorganized behavior, and inability to care for self in the context of likely medication nonadherence. The patient's last psychiatric hospitalization was 2017 at [facility] where it appears she was placed under a commitment " without Robledo which  in 2018.  Per chart review,  Dr. Rebolledo at [facility] is the outpatient psychiatric provider. There is genetic loading for mood, intellectual disability, autism. Current psychosocial stressors include possibly losing her apartment next month, SPMI, suspected medication nonadherence. The patient denies drug abuse or self injurious behaviors. The MSE is notable for a malodorous, disheveled woman who is calm with a euthymic affect. She endorses grandiose and paranoid delusions and demonstrates a disorganized, illogical thought process. The patient's presentation is most consistent with psychosis. However, she does not appear to be currently manic or depressed. This is therefore Psychosis NEC and requires further clarification.  I have concerns that use of phentermine may be contributing to possible psychosis, although medication adherence is questionable.      Hospital course: Tosha Mcnair was admitted to station 22 with  on a 72 hour hold. Her history was reviewed which indicated better community function with involuntary commitment and PS which  a year ago with deterioration since then, so on 3/4/18 civil committment petition was filed. The patient insisted she did not need to be hospitalized, and wanted to resume her previous medicaitons. However, because of her poor adherence (LTG level undectable on admission), the lamotrigine was not started. She declined our recommendation of a mood stabilizer, so for psychosis and denisse we started Risperidone 2 mg at bedtime, expecting the patient to contiue to refuse any scheduled medications.    Medical course : Patient was cleared medically  by the ED physician for inpatient psychiatry admission.    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      Medications:   -Olanzapine 10 mg PO/IM Q2H PRN for agitation  -Hydroxyzine 25-50 Q6H PRN for anxiety  -Melatonin 3 mg at bedtime for sleep  -PTA lamotrigine 100 mg daily held given -concerns for medication adherence, risk of SJS  -PTA phentermine held given concerns for contributions to mental health decompensation, unknown medication adherence     Medical diagnoses to be addressed this admission:     #Asthma, by history:   - Per recrods PTA Flovent and PTA albuterol PTA Singulair. Patient reports only taking Flovent and Singulair in the summer and does not want them given here.  -Continue PTA albuterol prn only     #Hyperlipidemia, by history: Low HDL of 40, otherwise unremarkable.      #Hepatitis C carrier, by history: On review of chart, HCV antibody negative in 2017 at [facility].  Check CMP in a.m., primary team may consider further workup, IM consult if needed     # Health Maintenance:  Mylanta PRN for indigestion  Milk of Magnesia PRN for constipation     Labs: TSH, CBC, BMP, Vitamin D all seems within normal limits.  - Drug screen Blood ( ordered on 3/4/19)        Patient will be treated in therapeutic milieu with appropriate individual and group therapies as described.        Legal Status: 72-h Hold signed on 3/1/2019, Civil committment petition filed on 3/4/19.        Safety Assessment:   Behavioral Orders   Procedures     Code 1 - Restrict to Unit     Elopement precautions     Routine Programming     As clinically indicated     Self Injury Precaution     Single Room     Status 15     Every 15 minutes.     Suicide precautions     Patients on Suicide Precautions should have a Combination Diet ordered that includes a Diet selection(s) AND a Behavioral Tray selection for Safe Tray - with utensils, or Safe Tray - NO utensils          Disposition: unknown pending clinical stabilization and formulating safe discharge plans.    -------------------------------------------------------  Pt seen and discussed with my attending .     Jada March MD  Psychiatry PGY-1  [phone]    Psychiatry Attending Attestation:  This patient has been seen and evaluated by me, Arden Stafford M.D.  The patient's condition and treatment plan were discussed with the resident, and care coordinated with the CTC and RN. I reviewed, edited and agree with the findings and plan in this note.     Arden Stafford M.D.   of Psychiatry

## 2019-03-05 NOTE — PROGRESS NOTES
Appears preoccupied. Guarded. Unusual eating habits. Several hours spent picking at food. Staring out window. Generally flat and shows little motivation.         03/04/19 2100   Behavioral Health   Hallucinations appears responding   Thinking distractable  (Paranoid features.)   Orientation person: oriented;place: oriented   Insight poor   Judgement impaired   Eye Contact at examiner   Affect blunted, flat   Mood anxious   Physical Appearance/Attire untidy;disheveled   1. Wish to be Dead No   Activity isolative;withdrawn   Speech coherent;clear

## 2019-03-06 PROCEDURE — 99231 SBSQ HOSP IP/OBS SF/LOW 25: CPT | Mod: GC | Performed by: PSYCHIATRY & NEUROLOGY

## 2019-03-06 PROCEDURE — 12400001 ZZH R&B MH UMMC

## 2019-03-06 ASSESSMENT — ACTIVITIES OF DAILY LIVING (ADL)
HYGIENE/GROOMING: INDEPENDENT
DRESS: INDEPENDENT
LAUNDRY: UNABLE TO COMPLETE
ORAL_HYGIENE: INDEPENDENT
HYGIENE/GROOMING: INDEPENDENT

## 2019-03-06 NOTE — PROGRESS NOTES
03/06/19 1353   Behavioral Health   Hallucinations appears responding   Thinking paranoid;delusional;poor concentration   Orientation person: oriented;time: oriented;date: oriented;place: oriented   Memory baseline memory   Insight poor   Judgement impaired   Affect blunted, flat   Mood mood is calm   Physical Appearance/Attire disheveled   Hygiene neglected grooming - unclean body, hair, teeth   Suicidality other (see comments)  (Denies)   1. Wish to be Dead No   2. Non-Specific Active Suicidal Thoughts  No   Self Injury other (see comment)  (Denies)   Elopement Statements about wanting to leave  (wants to be transfered to [facility])   Activity withdrawn;isolative   Speech coherent;clear   Medication Sensitivity no observed side effects;no stated side effects   Psychomotor / Gait balanced;steady   Activities of Daily Living   Hygiene/Grooming independent   Oral Hygiene independent   Dress independent   Laundry unable to complete   Room Organization independent     Tosha had a calm but isolative shift this morning. She was observed appearing to talk to someone while she was alone in her room. When asking her questions she would give long answers about her stay here and how she should be in a different unit and she wants to be transferred. She denied SI, SIB, HI and denied hallucinations. She appeared with a blunted affect and said that everyone was wrong about her stay here and she wanted to leave. She came out for meals but brought her food back to her room. She did not socialize with any peers, only talking with select staff. She was perseverating on moving to the [facility] and said that is where she was originally suppose to go. She gave the names of people to contact in order to get her over there. She said she met with her doctor today and staff asked if her doctor said anything about a transfer. She told this writer to call her doctor. There were no behavioral concerns this shift.

## 2019-03-06 NOTE — PROGRESS NOTES
Petition was supported by [facility]. Pre-mcintyre is on 3/7 @ 9:00am and the final hearing is on 3/14 @ 9:00am.

## 2019-03-06 NOTE — PROGRESS NOTES
03/05/19 2140   Behavioral Health   Hallucinations denies / not responding to hallucinations   Thinking paranoid   Orientation time: oriented;date: oriented;place: oriented;person: oriented   Memory other (see comment)  (difficult to assess)   Insight admits / accepts   Judgement impaired   Eye Contact at examiner   Affect blunted, flat   Mood other (see comments)   Physical Appearance/Attire disheveled   Hygiene neglected grooming - unclean body, hair, teeth   Suicidality other (see comments)  (Denies)   Self Injury other (see comment)  (Denies )   Activity isolative;withdrawn;refusal   Speech incoherent;coherent   Psychomotor / Gait steady;balanced     Pt. Shared that has little paranoid thinking. Pt. Feels like it is manageable though. Pt. Would like to discharge tomorrow. Pt. Does not feel as though experiencing a mental health crisis at this time. Pt. Is isolative to room at this time. Pt. Attempting to share the events of how she arrived to the hospital, however, speech was incoherent at times. Pt. Shared concerns about being discharged in time for an appointment on Friday.

## 2019-03-06 NOTE — PROGRESS NOTES
"    ----------------------------------------------------------------------------------------------------------  [facility], [place]   Psychiatric Progress Note  Hospital Day #5     Interim History:   The patient's care was discussed with the treatment team and chart notes were reviewed.    Sleep: 6 hours  Scheduled Medications: Refused all scheduled meds  PRN medications: None  Staff Report: Endorsed paranoid thinking, interested in discharging from hospital.  Did not attend groups, no socialization with peers.  Refused urine pregnancy specimen. Isolative to room.  Speech incoherent at times.    Patient Interview: Patient interviewed through closed door while in shower. Explained to her that court appointed examiner is here to meet with her. She states \"I already did that\" and was initially not agreeable to meeting with her. Discussed that this was her chance to \"prove her case to the courts.\" She ultimately agrees to end her shower and meet with examiner. No SI/HI or AVH elicted on interview today.     The risks, benefits, alternatives and side effects of any medication changes have been discussed and are understood by the patient and other caregivers.    Review of systems:     ROS was negative unless noted above.          Allergies:     Allergies   Allergen Reactions     Codeine Anaphylaxis     Morphine Anaphylaxis     Cardiac arrest     Darvocet [Propoxyphene N-Apap] Other (See Comments)     Affects kidneys     Divalproex Sodium-Fd&C Red #40 Hives and Swelling     Lithium Hives and Swelling     Penicillins Hives     Phenytoin Hives     Sulfa Drugs Hives     Also increases liver function     Topiramate Rash and Swelling            Psychiatric Examination:   /79   Pulse 77   Temp 98.1  F (36.7  C) (Oral)   Resp 16   Wt 102.3 kg (225 lb 8 oz)   SpO2 100%   BMI 32.36 kg/m    Weight is 225 lbs 8 oz  Body mass index is 32.36 kg/m .    Brief MSE: Interviewed through close door " while showering.  Stated she was not interested in meeting with examiner, ultimately agrees to do so after explanation of situation.  Her speech is loud, and affect was irritable and argumentative.  No SI, HI, AVH elicited although interview was brief as patient was argumentative and unwilling to stop showering.  Insight and judgment remain poor.         Labs:   No results found for this or any previous visit (from the past 24 hour(s)).       Assessment    Diagnostic Impression:   Tosha Mcnair is a 39 year old female with a history of bipolar affective disorder, autism, unspecified anxiety, and mild intellectual disability who presented on health and  hold to outside ED with concern from [facility]  and [facility] crisis for paranoia, delusions, disorganized behavior, and inability to care for self in the context of likely medication nonadherence.  Most recent psychiatric hospitalization was  at [facility] where it appears she was placed under a commitment without Robledo which  in 2018.  Per chart review,  Dr. Rebolledo at [facility] is the outpatient psychiatric provider. There is genetic loading for mood, intellectual disability, autism. Current psychosocial stressors include housing instability, SPMI, medication nonadherence.  No evidence of drug use or self-injurious behavior.  The MSE is notable for a malodorous, disheveled woman who endorses grandiose and paranoid delusions and demonstrates a disorganized, illogical thought process. The patient's presentation is most consistent at this time with psychosis NEC, rule out acute denisse with psychotic features.  It is also possible the patient's PTA phentermine may have contributed to her decompensation, however medication adherence prior to the hospitalization is questionable..      Hospital course: Tosha Mcnair was admitted to station 22 with  on a 72 hour hold. Her history indicated  decline in function since expiration of commitment in February 2018.  Paperwork for commitment and Robledo was filed 3/4 given concern for inability to care for self outside of hospital at this time.  PTA lamotrigine was held given evidence of poor adherence.  Declined treatment team recommendation for initiation of new mood stabilizer.  Risperidone 2 mg nightly initiated to target psychosis and denisse.     Medical course : Patient was cleared medically  by the ED physician for inpatient psychiatry admission.    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     Medications:  Risperidone 2 mg at bedtime started 3/5  Discontinued PTA lamotrigine 100 mg daily held given concerns for medication adherence, risk of SJS  Discontinued PTA phentermine given concerns for contributions to mental health decompensation  -Olanzapine 10 mg PO/IM Q2H PRN for agitation  -Hydroxyzine 25-50 Q6H PRN for anxiety  -Melatonin 3 mg at bedtime for sleep    Medical diagnoses to be addressed this admission:     #Asthma, by history: Patient only taking PTA Flovent and Singulair in the summer, held by patient request  -Continue PTA albuterol prn      #Hyperlipidemia, by history: Low HDL of 40, otherwise unremarkable.      #Hepatitis C carrier, by history: On review of chart, HCV antibody negative in 2017 at [facility].      # Health Maintenance:  Mylanta PRN for indigestion  Milk of Magnesia PRN for constipation    Labs:  U tox not collected  CBC, CMP within normal limits  HDL low at 40  TSH 2.49  Vitamin D 28  Lamotrigine level less than 0.9    Patient will be treated in therapeutic milieu with appropriate individual and group therapies as described.    Legal Status: Court  hold    Safety Assessment:   Behavioral Orders   Procedures     Code 1 - Restrict to Unit     Elopement precautions     Routine Programming     As clinically indicated     Self Injury Precaution     Single Room     Status 15     Every 15 minutes.     Suicide precautions     Patients on Suicide Precautions should have a Combination Diet ordered that includes a Diet selection(s) AND a Behavioral Tray selection for Safe Tray - with utensils, or Safe Tray - NO utensils         Disposition: unknown pending clinical stabilization and formulating safe discharge plans.    -------------------------------------------------------  Pt seen and discussed with my attending, MD Luis Manuel Leyva MD  PGY2 Resident  Pager: [phone]    Psychiatry Attending Attestation:  This patient has been seen and evaluated by me, Arden Stafford M.D.  The patient's condition and treatment plan were discussed with the resident, and care coordinated with the CTC and RN. I reviewed, edited and agree with the findings and plan in this note.     Arden Stafford M.D.   of Psychiatry

## 2019-03-07 PROCEDURE — 12400001 ZZH R&B MH UMMC

## 2019-03-07 ASSESSMENT — ACTIVITIES OF DAILY LIVING (ADL)
HYGIENE/GROOMING: INDEPENDENT
ORAL_HYGIENE: INDEPENDENT
LAUNDRY: WITH SUPERVISION
DRESS: INDEPENDENT
DRESS: INDEPENDENT
ORAL_HYGIENE: INDEPENDENT
LAUNDRY: WITH SUPERVISION
HYGIENE/GROOMING: INDEPENDENT

## 2019-03-07 NOTE — PLAN OF CARE
"Tosha returned from court feeling optimistic-states she is very relieved because while she was at court they did a ?MEK...? bone analysis by burrowing deep into her bone to obtain her MEK bone to prove her true identity-states this is a  procedure-states this proved who she really is-dramatically tossed commitment papers onto counter stating  said, \"None of this applies to me!\" and pleased  is aware of her true identity-states  has reviewed all videos from her apartment and agrees she is correct-states papers releasing her will be arriving shortly from her -reviewed with Tosha papers which were sent with her from court stating final hearing is scheduled for March 14-Tosha dismissed this information stating her  would be faxing instructions for her discharge-Tosha did remain pleasant throughout this conversation amanda stating how happy she is  has obtained important information  "

## 2019-03-07 NOTE — PROGRESS NOTES
Patient slept 4 hours overnight. Patient had sleep interrupted due to noise on the unit. Patient sitting quietly either in her room or the lounge when awake.

## 2019-03-07 NOTE — PROGRESS NOTES
"Isolative to room the whole shift. Out for meals but ate in her room. Seen reacting to internal stim, talking to her self in her room freq. 2100 pt approached desk asking \"Why is the wall and floor in my room moving?\" Denies court date and mental illness. 2125 Pt at  talking to her self at .       03/06/19 1800   Behavioral Health   Hallucinations auditory;appears responding;other (see comment)  (Talking to self and gesturing)   Thinking paranoid;delusional;distractable;poor concentration   Orientation person: oriented;place: oriented   Insight poor   Judgement impaired   Affect blunted, flat   Mood anxious   Physical Appearance/Attire disheveled   Hygiene neglected grooming - unclean body, hair, teeth   1. Wish to be Dead No   2. Non-Specific Active Suicidal Thoughts  No   Activity isolative;withdrawn   Speech clear;coherent   Psychomotor / Gait balanced;steady   Activities of Daily Living   Hygiene/Grooming independent  (Declined all)     "

## 2019-03-08 PROCEDURE — 99232 SBSQ HOSP IP/OBS MODERATE 35: CPT | Mod: GC | Performed by: PSYCHIATRY & NEUROLOGY

## 2019-03-08 PROCEDURE — 12400001 ZZH R&B MH UMMC

## 2019-03-08 ASSESSMENT — ACTIVITIES OF DAILY LIVING (ADL): HYGIENE/GROOMING: INDEPENDENT

## 2019-03-08 NOTE — PROGRESS NOTES
Pt denies SI/SIB or hallucinations. Pt refused her Risperdal stating that she went to court today and the  and doctors said that she doesn't need it. Pt made multiple delusion statements during conversation with writer. A Petition has been supported by [facility] and the final hearing is on 3/14 at 9 am. Pt spent most of time in her room. Pt was also observed by staff talking to herself while looking at her reflection in the window.

## 2019-03-08 NOTE — PROGRESS NOTES
Pleasant and approachable. Appetite good. Typically keeps to self. Denies symptoms.         03/08/19 1400   Behavioral Health   Hallucinations denies / not responding to hallucinations   Thinking distractable;poor concentration;paranoid   Orientation situation, disoriented;person: oriented;place: oriented   Insight denial of illness;poor   Judgement impaired   Affect blunted, flat   Mood depressed;irritable   Physical Appearance/Attire untidy   Hygiene (Fair.)   1. Wish to be Dead No   Activity isolative;withdrawn   Speech coherent;clear

## 2019-03-08 NOTE — PROGRESS NOTES
"    ----------------------------------------------------------------------------------------------------------  [facility], [place]   Psychiatric Progress Note  Hospital Day #7     Interim History:   The patient's care was discussed with the treatment team and chart notes were reviewed.    Sleep: 6 hours  Scheduled Medications: Refused all scheduled meds  PRN medications: None  Staff Report: Return from court feeling optimistic yesterday.  Stated \"she is very relieved because while she was at court they did an ?MEK ?  Bone analysis by burrowing deep into her bone to obtain her  MEK bone to improve her true identity-states this is a  procedure-states this proved who she really is \" continue to make delusional statements about experience in court.  Refused scheduled medications.    Patient Interview: Patient seen in her room by treatment team.  She states she is doing well today.  States court went very well yesterday.  She reports that, in court, the  wanted \"proof and history of my treatment.\"  She states that arrangements were made in this court hearing for her to follow-up with 3 providers, including David Gaston of [facility], Norberto Gaston who is OB/GYN/psych/neuro, and Ascencion Simmons who is both a gynecologist and gastroenterologist.  She states the  decided to change where she sees her primary care provider.  She states that, in court, they also did a \"bone check for severe identity theft.  She states they used a \"[place] Light on a needle\"which they pushed through the skin and her forehead and forearm, and scanned a code inscribed on her bone which read \"case ID [id number] HOC [facility], for skull hairline fracture.\"  She states that the  decided that she should not take risperidone due to liver toxicity.  She agrees with plan to await documentation from court and her final hearing next week on 3/14.  No other issues discussed today    The risks, benefits, " "alternatives and side effects of any medication changes have been discussed and are understood by the patient and other caregivers.    Review of systems:     ROS was negative unless noted above.          Allergies:     Allergies   Allergen Reactions     Codeine Anaphylaxis     Morphine Anaphylaxis     Cardiac arrest     Darvocet [Propoxyphene N-Apap] Other (See Comments)     Affects kidneys     Divalproex Sodium-Fd&C Red #40 Hives and Swelling     Lithium Hives and Swelling     Penicillins Hives     Phenytoin Hives     Sulfa Drugs Hives     Also increases liver function     Topiramate Rash and Swelling            Psychiatric Examination:   /46 (BP Location: Left arm)   Pulse 59   Temp 97.9  F (36.6  C) (Oral)   Resp 16   Wt 102.1 kg (225 lb)   SpO2 100%   BMI 32.28 kg/m    Weight is 225 lbs 0 oz  Body mass index is 32.28 kg/m .    Appearance:  awake, alert and Disheveled, seated crosslegged on bed  Attitude: Guarded and annoyed  Eye Contact: Fair  Mood:  \"fine\"  Affect: Affect labile and irritable  Speech:  clear, coherent, Loud volume  Psychomotor Behavior:  no evidence of tardive dyskinesia, dystonia, or tics and intact station, gait and muscle tone  Thought Process:  Disorganized and tangential  Associations:  loosening of associations present  Thought Content:  No SI/HI or AVH elicted. Ongoing grandiose, paranoid and delusional thought content about bone scans performed in court yesterday due to \"severe identity theft.\"  Insight:  limited  Judgment:  limited  Oriented to:  time, person, and place  Attention Span and Concentration:  fair  Recent and Remote Memory:  fair  Language: Speaks English appropriately  Fund of Knowledge: appropriate  Muscle Strength and Tone: normal  Gait and Station: not assessed         Labs:   No results found for this or any previous visit (from the past 24 hour(s)).       Assessment    Diagnostic Impression:   Tosha Mcnair is a 39 year old female with a history " of bipolar affective disorder, autism, unspecified anxiety, and mild intellectual disability who presented on health and  hold to outside ED with concern from [facility]  and [facility] crisis for paranoia, delusions, disorganized behavior, and inability to care for self in the context of likely medication nonadherence.  Most recent psychiatric hospitalization was  at [facility] where it appears she was placed under a commitment without Robledo which  in 2018.  Per chart review,  Dr. Rebolledo at [facility] is the outpatient psychiatric provider. There is genetic loading for mood, intellectual disability, autism. Current psychosocial stressors include housing instability, SPMI, medication nonadherence.  No evidence of drug use or self-injurious behavior.  The MSE is notable for a malodorous, disheveled woman who endorses grandiose and paranoid delusions and demonstrates a disorganized, illogical thought process. The patient's presentation is most consistent at this time with psychosis NEC, rule out acute denisse with psychotic features.  It is also possible the patient's PTA phentermine may have contributed to her decompensation, however medication adherence prior to the hospitalization is questionable..      Hospital course: Tosha Mcnair was admitted to station 22 with  on a 72 hour hold. Her history indicated decline in function since expiration of commitment in 2018.  Paperwork for commitment and Robledo was filed 3/ given concern for inability to care for self outside of hospital at this time.  PTA lamotrigine was held given evidence of poor adherence.  Declined treatment team recommendation for initiation of new mood stabilizer.  Risperidone 2 mg nightly initiated to target psychosis and denisse.  She has thus far refused all medications.    Medical course : Patient was cleared medically  by the ED physician for inpatient  psychiatry admission.    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     Medications:  Risperidone 2 mg at bedtime started 3/5  Discontinued PTA lamotrigine 100 mg daily held given concerns for medication adherence, risk of SJS  Discontinued PTA phentermine given concerns for contributions to mental health decompensation  -Olanzapine 10 mg PO/IM Q2H PRN for agitation  -Hydroxyzine 25-50 Q6H PRN for anxiety  -Melatonin 3 mg at bedtime for sleep    Medical diagnoses to be addressed this admission:     #Asthma, by history: Patient only taking PTA Flovent and Singulair in the summer, held by patient request  -Continue PTA albuterol prn      #Hyperlipidemia, by history: Low HDL of 40, otherwise unremarkable.      #Hepatitis C carrier, by history: On review of chart, HCV antibody negative in 2017 at [facility].      # Health Maintenance:  Mylanta PRN for indigestion  Milk of Magnesia PRN for constipation    Labs:  U tox not collected  CBC, CMP within normal limits  HDL low at 40  TSH 2.49  Vitamin D 28  Lamotrigine level less than 0.9    Patient will be treated in therapeutic milieu with appropriate individual and group therapies as described.    Legal Status: Court hold    Safety Assessment:   Behavioral Orders   Procedures     Code 1 - Restrict to Unit     Elopement precautions     Routine Programming     As clinically indicated     Self Injury Precaution     Single Room     Status 15     Every 15 minutes.     Suicide precautions     Patients on Suicide Precautions should have a Combination Diet ordered that includes a Diet selection(s) AND a Behavioral Tray selection for Safe Tray - with utensils, or Safe Tray - NO utensils         Disposition:  unknown pending clinical stabilization and formulating safe discharge plans.    -------------------------------------------------------  Pt seen and discussed with my attending, MD Luis Manuel Leyva MD  PGY2 Resident  Pager: [phone]    Psychiatry Attending Attestation:  This patient has been seen and evaluated by me, Arden Stafford M.D.  The patient's condition and treatment plan were discussed with the resident, and care coordinated with the CTC and RN. I reviewed, edited and agree with the findings and plan in this note.     Arden Stafford M.D.   of Psychiatry

## 2019-03-09 PROCEDURE — 12400001 ZZH R&B MH UMMC

## 2019-03-09 PROCEDURE — 25000132 ZZH RX MED GY IP 250 OP 250 PS 637: Performed by: STUDENT IN AN ORGANIZED HEALTH CARE EDUCATION/TRAINING PROGRAM

## 2019-03-09 RX ORDER — ACETAMINOPHEN 325 MG/1
650 TABLET ORAL EVERY 4 HOURS PRN
Status: DISCONTINUED | OUTPATIENT
Start: 2019-03-09 | End: 2019-04-01 | Stop reason: HOSPADM

## 2019-03-09 RX ADMIN — ACETAMINOPHEN 650 MG: 325 TABLET, FILM COATED ORAL at 20:42

## 2019-03-09 ASSESSMENT — ACTIVITIES OF DAILY LIVING (ADL)
HYGIENE/GROOMING: INDEPENDENT
LAUNDRY: WITH SUPERVISION
DRESS: INDEPENDENT
ORAL_HYGIENE: INDEPENDENT

## 2019-03-09 NOTE — PROGRESS NOTES
Isolative to her room most of shift. Presents paranoid and disorganized. Irritable at times. Not social with peers. Takes meals tray to her room to eat.       03/08/19 1800   Behavioral Health   Hallucinations appears responding;auditory;other (see comment)  (Talking to self freq)   Thinking paranoid;distractable;poor concentration;delusional   Orientation situation, disoriented   Insight poor;denial of illness   Judgement impaired   Affect blunted, flat;tense   Mood anxious;irritable   Physical Appearance/Attire disheveled   Hygiene neglected grooming - unclean body, hair, teeth   1. Wish to be Dead No   2. Non-Specific Active Suicidal Thoughts  No   Activity isolative;withdrawn;refusal   Speech clear   Psychomotor / Gait balanced;steady   Activities of Daily Living   Hygiene/Grooming independent  (Declined all)

## 2019-03-09 NOTE — PROGRESS NOTES
03/09/19 1420   Behavioral Health   Hallucinations appears responding;visual   Thinking distractable   Orientation person: oriented;date, disoriented;place: oriented   Insight poor   Judgement impaired   Eye Contact at examiner;at floor   Affect other (see comments)  (neutral)   Mood mood is calm   Physical Appearance/Attire attire appropriate to age and situation   Suicidality other (see comments)  (denies thoughts)   1. Wish to be Dead No   2. Non-Specific Active Suicidal Thoughts  No   Self Injury other (see comment)  (None observed or reported)   Elopement (None observed or reported )   Activity withdrawn;isolative   Speech coherent   Medication Sensitivity no stated side effects     Pt has been isolative in her room most of shift. Attended a community meeting, and retreated back to her room soon after. Pleasant and polite during conversations. Observed to respond to internal stimuli. Stares at space. Observed to gesture as if she is removing things from her hair, when there is nothing there. Pt stated her goal is to speak with CM about court matters. Writer informed her a CM can talk with her about it tomorrow, and pt accepted. Denies thoughts to hurt herself or others.Contracts for safety.

## 2019-03-10 PROCEDURE — 12400001 ZZH R&B MH UMMC

## 2019-03-10 ASSESSMENT — ACTIVITIES OF DAILY LIVING (ADL)
HYGIENE/GROOMING: INDEPENDENT
ORAL_HYGIENE: INDEPENDENT
DRESS: INDEPENDENT
HYGIENE/GROOMING: INDEPENDENT

## 2019-03-10 NOTE — PLAN OF CARE
Pt has been isolative to room much of this pm,she did come out and interacted some with peers and staff ,her thoughts remain disorganized and delusional,and appears to respond to visual stimuli,paranoia prevalent.she remains on a court hold,refuses medications,precautions are in place for suicide,sib,and elopement.defer to team

## 2019-03-10 NOTE — PROGRESS NOTES
Isolative to her room most of shift. Blunted. Ate meals in her room.     03/10/19 1400   Behavioral Health   Hallucinations auditory;appears responding;other (see comment);visual  (Gesturing and talking to self in her room)   Thinking distractable;poor concentration   Orientation person: oriented;place: oriented;situation, disoriented   Memory new learning, recall loss   Insight poor   Judgement impaired   Eye Contact at examiner   Affect blunted, flat   Mood anxious   Physical Appearance/Attire attire appropriate to age and situation   1. Wish to be Dead No   2. Non-Specific Active Suicidal Thoughts  No   Activity isolative;withdrawn   Speech clear;coherent   Psychomotor / Gait balanced;steady   Psycho Education   Type of Intervention structured groups   Response refuses   Activities of Daily Living   Hygiene/Grooming independent  (Declined all)

## 2019-03-11 PROCEDURE — 99232 SBSQ HOSP IP/OBS MODERATE 35: CPT | Mod: GC | Performed by: PSYCHIATRY & NEUROLOGY

## 2019-03-11 PROCEDURE — 12400001 ZZH R&B MH UMMC

## 2019-03-11 ASSESSMENT — ACTIVITIES OF DAILY LIVING (ADL)
DRESS: INDEPENDENT
LAUNDRY: UNABLE TO COMPLETE
HYGIENE/GROOMING: SHOWER;INDEPENDENT
DRESS: INDEPENDENT
ORAL_HYGIENE: INDEPENDENT
HYGIENE/GROOMING: INDEPENDENT
LAUNDRY: WITH SUPERVISION
ORAL_HYGIENE: INDEPENDENT

## 2019-03-11 NOTE — PLAN OF CARE
"Tosha more willing to engage in conversation-wanted to review commitment papers, ezekiel repeatedly reviewing sentence stating she is unable to renew lease at current home-giving various reasons why cassandra is not legally able to refuse to renew her lease-speaking more clearly, but continues delusional-requesting [facility] arrange for her to have a different [facility]  stating when current  walked into court hearing  said, \"Hi ex-wife.\" and informed Tosha she should get a different -per [facility] Tosha has not been assigned a  at this point in commitment process-Tosha complaining again of not getting prescribed medications Lamictal and phentermine-states she has been seeing a diet MD for long period of time and receiving phentermine to help her lose weight-did mention again Seroquel as preferred antipsychotic     "

## 2019-03-11 NOTE — PROGRESS NOTES
"    ----------------------------------------------------------------------------------------------------------  [facility], [place]   Psychiatric Progress Note  Hospital Day #10     Interim History:   The patient's care was discussed with the treatment team and chart notes were reviewed.    Sleep: 7h  Scheduled Medications: Declined risperidone  PRNs: None  Staff Report: No acute events overnight. Active auditory hallucinations present throughout weekend, along with paranoia.    Patient Interview: Patient was interviewed in the patient's room on Station 22. Tosha stated that she had a good weekend and that she slept well. She then asked \"I wanted to request a different provider than Dr. Stafford, we don't see eye to eye.\" She was informed that her current treatment team would be here for the next week. We discussed Risperidone, and she stated \"I can't take that, it gives me hallucinations.\" She then stated lurasidone \"gave me RSV, sick, you know?\" ziprasidone gave her diarrhea, Haldol \"made me sick,\" and Abilify \"made my legs jumpy.\" She stated that the only medications that worked for her were quetiapine and lamotrigine. She stated that she has a sensitive GI tract, and medications \"upset my stomach more easily.\" We discussed her court date, and answered questions regarding the hearing. She had no additional questions or concerns.    The risks, benefits, alternatives and side effects of any medication changes have been discussed and are understood by the patient and other caregivers.    Review of systems:     ROS was negative unless noted above.          Allergies:     Allergies   Allergen Reactions     Codeine Anaphylaxis     Morphine Anaphylaxis     Cardiac arrest     Darvocet [Propoxyphene N-Apap] Other (See Comments)     Affects kidneys     Divalproex Sodium-Fd&C Red #40 Hives and Swelling     Lithium Hives and Swelling     Penicillins Hives     Phenytoin Hives     Sulfa Drugs Hives " "    Also increases liver function     Topiramate Rash and Swelling            Psychiatric Examination:   /74   Pulse 73   Temp 97.9  F (36.6  C) (Oral)   Resp 16   Wt 102.1 kg (225 lb)   SpO2 99%   BMI 32.28 kg/m    Weight is 225 lbs 0 oz  Body mass index is 32.28 kg/m .    Appearance:  awake, alert and dressed in hospital scrubs  Attitude:  cooperative  Eye Contact:  good  Mood:  \"fine\"  Affect:  mood congruent, some lability  Speech:  rapid speech, but interruptable  Psychomotor Behavior:  no evidence of tardive dyskinesia, dystonia, or tics  Thought Process:  Generally logical, linear, and goal oriented  Associations:  no loose associations  Thought Content:  no evidence of suicidal ideation or homicidal ideation  Insight:  fair  Judgment:  fair  Oriented to:  time, person, and place  Attention Span and Concentration:  intact  Recent and Remote Memory:  intact  Language: speaks fluent English  Fund of Knowledge: appropriate  Muscle Strength and Tone: normal  Gait and Station: Normal         Labs:   No results found for this or any previous visit (from the past 24 hour(s)).       Assessment    Diagnostic Impression: Tosha Mcnair is a 39 year old female with a history of bipolar affective disorder, autism, unspecified anxiety, and mild intellectual disability who presented on health and  hold to outside ED with concern from [facility]  and [facility] crisis for paranoia, delusions, disorganized behavior, and inability to care for self in the context of likely medication nonadherence. Biological factors include medication nonadherance. Psychological factors include previous diagnoses of mood and neurodevelopmental disorders. Social factors include housing instability. The MSE on admission was notable for a malodorous, disheveled woman who endorses grandiose and paranoid delusions and demonstrates a disorganized, illogical thought process. The patient's presentation is " most consistent at this time with psychosis NEC, rule out acute denisse with psychotic features.  It is also possible the patient's PTA phentermine may have contributed to her decompensation, however medication adherence prior to the hospitalization is questionable.    Hospital course: Tosha Mcnair was admitted to station 22 with  on a 72 hour hold. Her history indicated decline in function since expiration of commitment in February 2018.  Paperwork for commitment and [place] was filed 3/4 given concern for inability to care for self outside of hospital at this time.  PTA lamotrigine was held given evidence of poor adherence.  Declined treatment team recommendation for initiation of new mood stabilizer.  Risperidone 2 mg nightly initiated to target psychosis and denisse.  She has thus far refused all medications.    Discontinued Medications (& Rationale):  None    Medical course Patient was cleared medically  by the ED physician for inpatient psychiatry admission. UTox negative. CBC, CMP, lipids, TSH, Vitamin D grossly wnl. Lamotrigine level undetectable on admission.    Plan   Principal Diagnosis:   # Psychosis NEC, rule out acute denisse with psychotic features     Secondary psychiatric diagnoses of concern this admission:   # Bipolar affective disorder by history  # Unspecified anxiety by history  # Autism by history  # Mild intellectual disability (IQ 50-70) by history    Psychotropic Medications:  Modify:    Continue:  - Risperidone 2 mg at bedtime started 3/5  - Olanzapine 10 mg PO/IM Q2H PRN for agitation  - Hydroxyzine 25-50 Q6H PRN for anxiety  - Melatonin 3 mg at bedtime for sleep    Patient will be treated in therapeutic milieu with appropriate individual and group therapies as described.      Medical diagnoses to be addressed this admission:    # Asthma  -Continue PTA albuterol prn      # Hyperlipidemia, by history   Low HDL of 40, otherwise unremarkable.      # Hepatitis C carrier, by history  On  review of chart, HCV antibody negative in 2017 at [facility].      # GI/FEN  - Mylanta PRN for indigestion  - Milk of Magnesia PRN for constipation      Data: As above  Consults: None    Legal Status: Court Hold    Safety Assessment:   Behavioral Orders   Procedures     Code 1 - Restrict to Unit     Elopement precautions     Routine Programming     As clinically indicated     Self Injury Precaution     Single Room     Status 15     Every 15 minutes.     Suicide precautions     Patients on Suicide Precautions should have a Combination Diet ordered that includes a Diet selection(s) AND a Behavioral Tray selection for Safe Tray - with utensils, or Safe Tray - NO utensils         Disposition: Unknown at this time, pending clinical stabilization and formulation of an appropriate treatment plan    This documentation accurately reflects the services I personally performed and treatment decisions made by me in consultation with the attending physician Jaylan Jane MD.    Wilmer Wellington MD  Psychiatry PGY-1  [phone]    Attestation:  This patient has been seen and evaluated by me, Jaylan Jane.  I have discussed this patient with the house staff team including the resident and medical student and I agree with the findings and plan in this note.    I have reviewed today's vital signs, medications, labs and imaging. Jaylan Jane MD

## 2019-03-11 NOTE — PROGRESS NOTES
Pt spent majority of shift in room, coming out to eat then returning to room.  Pt appears to be responding to AH.  Pt stated when she talks to her self, she is not hallucinating.  She is having conversations with god and/or other biblical characters.  Pt also gestures to self in room.  Pt states this is genetic and her whole family does this.  Pt distressed about legal situation.  Pt ate dinner.  Pt worked out in room.  Pt does not socialize with other pts.  Pt denies SI, SIB, and hallucinations.

## 2019-03-12 PROCEDURE — 25000132 ZZH RX MED GY IP 250 OP 250 PS 637: Performed by: STUDENT IN AN ORGANIZED HEALTH CARE EDUCATION/TRAINING PROGRAM

## 2019-03-12 PROCEDURE — 99232 SBSQ HOSP IP/OBS MODERATE 35: CPT | Mod: GC | Performed by: PSYCHIATRY & NEUROLOGY

## 2019-03-12 PROCEDURE — 12400001 ZZH R&B MH UMMC

## 2019-03-12 RX ORDER — QUETIAPINE FUMARATE 50 MG/1
50 TABLET, FILM COATED ORAL AT BEDTIME
Status: DISCONTINUED | OUTPATIENT
Start: 2019-03-12 | End: 2019-03-15

## 2019-03-12 RX ADMIN — QUETIAPINE FUMARATE 50 MG: 50 TABLET ORAL at 21:55

## 2019-03-12 ASSESSMENT — ACTIVITIES OF DAILY LIVING (ADL)
HYGIENE/GROOMING: INDEPENDENT
DRESS: INDEPENDENT
DRESS: INDEPENDENT
ORAL_HYGIENE: INDEPENDENT
HYGIENE/GROOMING: INDEPENDENT
ORAL_HYGIENE: INDEPENDENT

## 2019-03-12 NOTE — PROGRESS NOTES
03/11/19 2230   Behavioral Health   Hallucinations denies / not responding to hallucinations   Thinking poor concentration   Insight poor   Judgement impaired   Affect blunted, flat   Mood mood is calm   Physical Appearance/Attire attire appropriate to age and situation   Hygiene pre-occupied with grooming   Suicidality other (see comments)  (PT. denies )   1. Wish to be Dead No   2. Non-Specific Active Suicidal Thoughts  No   3. Active Sucidal Ideation with any Methods (Not Plan) Without Intent to Act  No   Activity isolative   Speech clear;coherent   Psychomotor / Gait balanced;steady   Activities of Daily Living   Hygiene/Grooming shower;independent   Oral Hygiene independent   Dress independent   Laundry unable to complete   Room Organization independent     Pt. Appeared calm and pleasant. Pt. Appeared to be withdrawn and isolative. Pt. Did not attend groups or eat dinner in the dinning area. Pt. Spent most of the time in her room. Pt. Took two showers. PT. Denies SI and SIB.

## 2019-03-12 NOTE — PROGRESS NOTES
"    ----------------------------------------------------------------------------------------------------------  [facility], [place]   Psychiatric Progress Note  Hospital Day #11     Interim History:   The patient's care was discussed with the treatment team and chart notes were reviewed.    Sleep: 6.75h  Scheduled Medications: Declined risperidone  PRNs: None  Staff Report: No acute events overnight. Asked to review commitment papers yesterday, concerned about reniewing lease at current apartment, asking to have [facility] arrange for to have a different [facility] . Overall appeared calm and pleasant.    Patient Interview: Patient was interviewed in the patient's room on Station 22. She slept well last night and feels rested this AM. She continued to decline risperidone pharmacotherapy. When asked about starting Seroquel at a low dose, she was agreeable. She reported concern about the weight gain side effects and sedation, but was agreeable to trying the medication. She requested to have a print-out describing the medication. She was interested in talking to a dietitian about food options. She was unsure of the dose of L-carnitine she was on at home, but stated \"1 tablespoon.\" She had no additional concerns or question at this time.    The risks, benefits, alternatives and side effects of any medication changes have been discussed and are understood by the patient and other caregivers.    Review of systems:     ROS was negative unless noted above.          Allergies:     Allergies   Allergen Reactions     Codeine Anaphylaxis     Morphine Anaphylaxis     Cardiac arrest     Darvocet [Propoxyphene N-Apap] Other (See Comments)     Affects kidneys     Divalproex Sodium-Fd&C Red #40 Hives and Swelling     Lithium Hives and Swelling     Penicillins Hives     Phenytoin Hives     Sulfa Drugs Hives     Also increases liver function     Topiramate Rash and Swelling            Psychiatric " "Examination:   /62   Pulse 73   Temp 98.5  F (36.9  C) (Oral)   Resp 16   Wt 102.1 kg (225 lb)   SpO2 96%   BMI 32.28 kg/m    Weight is 225 lbs 0 oz  Body mass index is 32.28 kg/m .    Appearance:  awake, alert and dressed in hospital scrubs  Attitude:  cooperative  Eye Contact:  good  Mood:  \"fine\"  Affect:  mood congruent, some lability  Speech:  rapid speech, but interruptable  Psychomotor Behavior:  no evidence of tardive dyskinesia, dystonia, or tics  Thought Process:  Generally logical, linear, and goal oriented  Associations:  no loose associations  Thought Content:  no evidence of suicidal ideation or homicidal ideation  Insight:  fair  Judgment:  fair  Oriented to:  time, person, and place  Attention Span and Concentration:  intact  Recent and Remote Memory:  intact  Language: speaks fluent English  Fund of Knowledge: appropriate  Muscle Strength and Tone: normal  Gait and Station: Normal         Labs:   No results found for this or any previous visit (from the past 24 hour(s)).       Assessment    Diagnostic Impression: Tosha Mcnair is a 39 year old female with a history of bipolar affective disorder, autism, unspecified anxiety, and mild intellectual disability who presented on health and  hold to outside ED with concern from [facility]  and [facility] crisis for paranoia, delusions, disorganized behavior, and inability to care for self in the context of likely medication nonadherence. Biological factors include medication nonadherance. Psychological factors include previous diagnoses of mood and neurodevelopmental disorders. Social factors include housing instability. The MSE on admission was notable for a malodorous, disheveled woman who endorses grandiose and paranoid delusions and demonstrates a disorganized, illogical thought process. The patient's presentation is most consistent at this time with psychosis NEC, rule out acute denisse with psychotic " features.  It is also possible the patient's PTA phentermine may have contributed to her decompensation, however medication adherence prior to the hospitalization is questionable.    Hospital course: Tosha Mcnair was admitted to station 22 with  on a 72 hour hold. Her history indicated decline in function since expiration of commitment in February 2018.  Paperwork for commitment and [place] was filed 3/4 given concern for inability to care for self outside of hospital at this time.  PTA lamotrigine was held given evidence of poor adherence.  Declined treatment team recommendation for initiation of new mood stabilizer.  Risperidone 2 mg nightly initiated to target psychosis and denisse, however she declined this medication throughout the hospital stay. Quetiapine was started to target symptoms of denisse and psychosis.    Discontinued Medications (& Rationale):  - Risperidone, as the patient declined to take this medication    Medical course Patient was cleared medically  by the ED physician for inpatient psychiatry admission. UTox negative. CBC, CMP, lipids, TSH, Vitamin D grossly wnl. Lamotrigine level undetectable on admission.    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      Medical diagnoses to be addressed this admission:    # Asthma  -Continue PTA albuterol prn      # Hyperlipidemia, by  history   Low HDL of 40, otherwise unremarkable.      # Hepatitis C carrier, by history  On review of chart, HCV antibody negative in 2017 at [facility].      # GI/FEN  - Mylanta PRN for indigestion  - Milk of Magnesia PRN for constipation    Data: As above  Consults: Nutrition    Legal Status: Court Hold    Safety Assessment:   Behavioral Orders   Procedures     Code 1 - Restrict to Unit     Elopement precautions     Routine Programming     As clinically indicated     Self Injury Precaution     Single Room     Status 15     Every 15 minutes.     Suicide precautions     Patients on Suicide Precautions should have a Combination Diet ordered that includes a Diet selection(s) AND a Behavioral Tray selection for Safe Tray - with utensils, or Safe Tray - NO utensils         Disposition: Unknown at this time, pending clinical stabilization and formulation of an appropriate treatment plan    This documentation accurately reflects the services I personally performed and treatment decisions made by me in consultation with the attending physician Jaylan Jane MD.    Wilmer Wellington MD  Psychiatry PGY-1  [phone]    Attestation:  This patient has been seen and evaluated by me, Jaylan Jane.  I have discussed this patient with the house staff team including the resident and medical student and I agree with the findings and plan in this note.    I have reviewed today's vital signs, medications, labs and imaging. Jaylan Jane MD

## 2019-03-12 NOTE — PROGRESS NOTES
Writer spoke to Wendy ([phone]) pt's  regarding patient's housing status. Patient is concerned that she is being evicted immediately from her apartment. Wendy states that she is not being evicted but her lease will being ending at the end of April. Wendy has a worker that is in place to help patient find a new apartment but patient has not been in the appropriate state of mind to work with this individual. They will plan on working on this once patient is more stable.

## 2019-03-12 NOTE — PROGRESS NOTES
Pt continues to be isolative to her room, even eats her meals in her room.  Not attending groups, not socializing with others.  No SI/SIB observed.         03/12/19 1300   Behavioral Health   Orientation person: oriented;place: oriented;date: oriented   Memory baseline memory   Insight poor   Judgement impaired   Eye Contact at examiner   Affect blunted, flat   Mood mood is calm   Physical Appearance/Attire attire appropriate to age and situation   Hygiene neglected grooming - unclean body, hair, teeth   Suicidality other (see comments)  (none observed)   1. Wish to be Dead No   2. Non-Specific Active Suicidal Thoughts  No   Self Injury other (see comment)  (none observed)   Activity isolative;withdrawn   Speech clear;coherent   Medication Sensitivity no observed side effects   Psychomotor / Gait balanced;steady   Psycho Education   Type of Intervention 1:1 intervention   Response observes from a distance   Activities of Daily Living   Hygiene/Grooming independent   Oral Hygiene independent   Dress independent   Room Organization independent   Behavioral Health Interventions   Social and Therapeutic Interventions (Psychotic Symptoms) encourage socialization with peers;encourage participation in therapeutic groups and milieu activities

## 2019-03-13 PROCEDURE — 12400001 ZZH R&B MH UMMC

## 2019-03-13 PROCEDURE — 25000132 ZZH RX MED GY IP 250 OP 250 PS 637: Performed by: STUDENT IN AN ORGANIZED HEALTH CARE EDUCATION/TRAINING PROGRAM

## 2019-03-13 PROCEDURE — 99232 SBSQ HOSP IP/OBS MODERATE 35: CPT | Mod: GC | Performed by: PSYCHIATRY & NEUROLOGY

## 2019-03-13 RX ADMIN — QUETIAPINE FUMARATE 50 MG: 50 TABLET ORAL at 22:54

## 2019-03-13 ASSESSMENT — ACTIVITIES OF DAILY LIVING (ADL)
ORAL_HYGIENE: INDEPENDENT
LAUNDRY: WITH SUPERVISION
HYGIENE/GROOMING: INDEPENDENT
DRESS: STREET CLOTHES;INDEPENDENT
DRESS: INDEPENDENT;STREET CLOTHES
ORAL_HYGIENE: INDEPENDENT
LAUNDRY: WITH SUPERVISION
HYGIENE/GROOMING: HANDWASHING;SHOWER;INDEPENDENT

## 2019-03-13 NOTE — PROGRESS NOTES
"CLINICAL NUTRITION SERVICES - ASSESSMENT NOTE     Nutrition Prescription    RECOMMENDATIONS FOR MDs/PROVIDERS TO ORDER:  None today. Pt was unavailable to meet with at this time. RD will follow-up as able.    Malnutrition Status:    Unable to determine due to inability to obtain nutrition history and physical findings at this time.    Recommendations already ordered by Registered Dietitian (RD):  None today    Future/Additional Recommendations:  Obtain nutrition history as able.  Encourage low calorie, high protein snacks. Also encourage pt to focus on building meals with appropriate portioned lean protein, vegetables, fruit, grain, and dairy.      REASON FOR ASSESSMENT  Tosha Mcnair is a/an 39 year old female assessed by the dietitian for Provider Order - \"Identify specific, healthy foods availabe within our cafeteria and within the patient's restricted diet\"    NUTRITION HISTORY  Unable to obtain at this time. Pt in shower and therefore unable to meet with.    CURRENT NUTRITION ORDERS  Diet: Regular  Intake/Tolerance: Unable to clarify at this time. Pt was started on Seroquel, had reported concerns about the weight gain side effects. Requested talking to RD about food options.     LABS  Calcium 8.4 (L, near low end of normal)    MEDICATIONS  Seroquel - likely increased appetite    ANTHROPOMETRICS  Height: 0 cm (Data Unavailable)  Ht Readings from Last 1 Encounters:   03/01/19 1.778 m (5' 10\")   Most Recent Weight: 102.5 kg (226 lb)    IBW: 68.2 kg (150% IBW)  BMI: Obesity Grade I BMI 30-34.9  Weight History: Unable to clarify wt trends with patient at this time d/t unavailable to meet with. Discrepancy in wts 3/1 and 3/12 given large differences in wts.  Wt Readings from Last 10 Encounters:   03/12/19 102.5 kg (226 lb)   03/01/19 85.7 kg (189 lb)   08/29/16 92.1 kg (203 lb)   03/07/16 91.2 kg (201 lb)     Dosing Weight: 77 kg (adjusted)    ASSESSED NUTRITION NEEDS  Estimated Energy Needs: 2922-7448 kcals/day (20 " - 25 kcals/kg)  Justification: Obese  Estimated Protein Needs: 60-80+ grams protein/day (0.8 - 1+ grams of pro/kg)  Justification: Maintenance  Estimated Fluid Needs: 1 mL/kcal, or per provider   Justification: Maintenance    PHYSICAL FINDINGS  See malnutrition section below.    MALNUTRITION  % Intake: Unable to assess  % Weight Loss: Unable to assess  Subcutaneous Fat Loss: Unable to assess  Muscle Loss: Unable to assess  Fluid Accumulation/Edema: Unable to assess  Malnutrition Diagnosis: Unable to determine due to inability to obtain nutrition history and physical findings at this time.    NUTRITION DIAGNOSIS  Predicted inadequate nutrient intake (protein-energy) related to variable appetite as evidenced by reliance on po intakes to meet estimated needs with potential for decline.       INTERVENTIONS  Implementation  Nutrition Education: Unable to complete      Goals  Patient to consume % of nutritionally adequate meal trays TID, or the equivalent with supplements/snacks.     Monitoring/Evaluation  Progress toward goals will be monitored and evaluated per protocol.    Victorina Lynch RD, LD  Unit pgr: [phone]

## 2019-03-13 NOTE — PROGRESS NOTES
"    ----------------------------------------------------------------------------------------------------------  [facility], [place]   Psychiatric Progress Note  Hospital Day #12     Interim History:   The patient's care was discussed with the treatment team and chart notes were reviewed.    Sleep: 6.5h  Scheduled Medications: Taken as prescribed  PRNs: None  Staff Report: No acute events overnight. Expressed concern that risperidone has caused her liver failure in the past, then stated random numbers and explained that those numbers are their case number at [facility] in [place] that will show they have had liver failure as a result of taking certain medication.     Patient Interview: Patient was interviewed in the patient's room on Station 22. She slept well last night and feels rested this AM. She states the quetiapine \"knocked me out last night.\" She requested to have the brand name quetiapine used. She had no additional questions or concerns.    The risks, benefits, alternatives and side effects of any medication changes have been discussed and are understood by the patient and other caregivers.    Review of systems:     ROS was negative unless noted above.          Allergies:     Allergies   Allergen Reactions     Codeine Anaphylaxis     Morphine Anaphylaxis     Cardiac arrest     Darvocet [Propoxyphene N-Apap] Other (See Comments)     Affects kidneys     Divalproex Sodium-Fd&C Red #40 Hives and Swelling     Lithium Hives and Swelling     Penicillins Hives     Phenytoin Hives     Sulfa Drugs Hives     Also increases liver function     Topiramate Rash and Swelling            Psychiatric Examination:   /74 (BP Location: Right arm)   Pulse 73   Temp 98.8  F (37.1  C) (Oral)   Resp 14   Wt 102.5 kg (226 lb)   SpO2 100%   BMI 32.43 kg/m    Weight is 226 lbs 0 oz  Body mass index is 32.43 kg/m .    Appearance:  awake, alert and dressed in hospital scrubs  Attitude:  " "cooperative  Eye Contact:  good  Mood:  \"fine\"  Affect:  mood congruent, some lability  Speech:  rapid speech, but interruptable  Psychomotor Behavior:  no evidence of tardive dyskinesia, dystonia, or tics  Thought Process:  Generally logical, linear, and goal oriented  Associations:  no loose associations  Thought Content:  no evidence of suicidal ideation or homicidal ideation  Insight:  fair  Judgment:  fair  Oriented to:  time, person, and place  Attention Span and Concentration:  intact  Recent and Remote Memory:  intact  Language: speaks fluent English  Fund of Knowledge: appropriate  Muscle Strength and Tone: normal  Gait and Station: Normal         Labs:   No results found for this or any previous visit (from the past 24 hour(s)).       Assessment    Diagnostic Impression: Tosha Mcnair is a 39 year old female with a history of bipolar affective disorder, autism, unspecified anxiety, and mild intellectual disability who presented on health and  hold to outside ED with concern from [facility]  and [facility] crisis for paranoia, delusions, disorganized behavior, and inability to care for self in the context of likely medication nonadherence. Biological factors include medication nonadherance. Psychological factors include previous diagnoses of mood and neurodevelopmental disorders. Social factors include housing instability. The MSE on admission was notable for a malodorous, disheveled woman who endorses grandiose and paranoid delusions and demonstrates a disorganized, illogical thought process. The patient's presentation is most consistent at this time with psychosis NEC, rule out acute denisse with psychotic features.  It is also possible the patient's PTA phentermine may have contributed to her decompensation, however medication adherence prior to the hospitalization is questionable.    Hospital course: Tosha Mcnair was admitted to station 22 with  on a 72 hour hold. " Her history indicated decline in function since expiration of commitment in February 2018.  Paperwork for commitment and [place] was filed 3/4 given concern for inability to care for self outside of hospital at this time.  PTA lamotrigine was held given evidence of poor adherence.  Declined treatment team recommendation for initiation of new mood stabilizer.  Risperidone 2 mg nightly initiated to target psychosis and denisse, however she declined this medication throughout the hospital stay. Quetiapine was started to target symptoms of denisse and psychosis.    Discontinued Medications (& Rationale):  - Risperidone, as the patient declined to take this medication    Medical course Patient was cleared medically  by the ED physician for inpatient psychiatry admission. UTox negative. CBC, CMP, lipids, TSH, Vitamin D grossly wnl. Lamotrigine level undetectable on admission.    Plan   Principal Diagnosis:   # Psychosis NEC, rule out acute denisse with psychotic features     Secondary psychiatric diagnoses of concern this admission:   # Bipolar affective disorder by history  # Unspecified anxiety by history  # Autism by history  # Mild intellectual disability (IQ 50-70) by history    Psychotropic Medications:  Modify:    Continue:  - Quetiapine 50mg QHS  - Olanzapine 10 mg PO/IM Q2H PRN for agitation  - Hydroxyzine 25-50 Q6H PRN for anxiety  - Melatonin 3 mg at bedtime for sleep    Patient will be treated in therapeutic milieu with appropriate individual and group therapies as described.      Medical diagnoses to be addressed this admission:    # Asthma  -Continue PTA albuterol prn      # Hyperlipidemia, by history   Low HDL of 40, otherwise unremarkable.      # Hepatitis C carrier, by history  On review of chart, HCV antibody negative in 2017 at [facility].      # GI/FEN  - Mylanta PRN for indigestion  - Milk of Magnesia PRN for constipation    Data: As above  Consults: Nutrition    Legal Status: Court Hold    Safety  Assessment:   Behavioral Orders   Procedures     Code 1 - Restrict to Unit     Elopement precautions     Routine Programming     As clinically indicated     Self Injury Precaution     Single Room     Status 15     Every 15 minutes.     Suicide precautions     Patients on Suicide Precautions should have a Combination Diet ordered that includes a Diet selection(s) AND a Behavioral Tray selection for Safe Tray - with utensils, or Safe Tray - NO utensils         Disposition: Unknown at this time, pending clinical stabilization and formulation of an appropriate treatment plan    This documentation accurately reflects the services I personally performed and treatment decisions made by me in consultation with the attending physician Jaylan Jane MD.    Wilmer Wellington MD  Psychiatry PGY-1  [phone]    Attestation:  This patient has been seen and evaluated by me, Jaylan Jane.  I have discussed this patient with the house staff team including the resident and medical student and I agree with the findings and plan in this note.    I have reviewed today's vital signs, medications, labs and imaging. Jaylan Jane MD

## 2019-03-13 NOTE — PROGRESS NOTES
Pt was observed withdrawn to their room for the majority of the evening. While in their room pt was observed to consistently make repetitive movements with their hands. During check-in with writer pt stated that they are not taking their medication because the specific medication they are being asked to take has caused them liver failure in the past. Pt then rambled random numbers and stated that those numbers are their case number at [facility] in [place] that will show they have had liver failure as a result of taking certain medication. Pt denies experiencing hallucinations as well as SI and SIB.      03/12/19 2052   Behavioral Health   Hallucinations denies / not responding to hallucinations   Thinking delusional;distractable;confused   Orientation person: oriented;place: oriented;date: oriented;time: oriented   Memory baseline memory   Insight poor   Judgement impaired   Eye Contact at examiner   Affect blunted, flat   Mood mood is calm   Physical Appearance/Attire attire appropriate to age and situation   Hygiene (adequate, showered)   Suicidality (Pt denies)   1. Wish to be Dead No   2. Non-Specific Active Suicidal Thoughts  No   Self Injury (Pt denies)   Activity isolative;withdrawn   Speech clear;coherent   Medication Sensitivity no observed side effects;no stated side effects   Psychomotor / Gait balanced;steady   Psycho Education   Type of Intervention 1:1 intervention   Response participates with encouragement   Hours 0.5   Treatment Detail Check-in   Activities of Daily Living   Hygiene/Grooming independent   Oral Hygiene independent   Dress independent   Room Organization independent

## 2019-03-13 NOTE — PROGRESS NOTES
Isolative to her room. Appears responding to internals stim, freq talking to self. Refused all groups. Eats meals in her room. Not social. Blunted affect.       03/13/19 0900   Behavioral Health   Hallucinations appears responding;other (see comment)  (Freq talking to self, gesturing)   Thinking distractable;poor concentration;delusional;confused   Orientation person: oriented;place: oriented   Insight poor   Judgement impaired   Affect blunted, flat   Mood anxious   Physical Appearance/Attire attire appropriate to age and situation   Hygiene other (see comment)  (adequate)   1. Wish to be Dead No   2. Non-Specific Active Suicidal Thoughts  No   Activity isolative;withdrawn   Speech clear   Psychomotor / Gait balanced;steady   Psycho Education   Type of Intervention structured groups   Response refuses   Activities of Daily Living   Hygiene/Grooming independent   Oral Hygiene independent   Dress independent;street clothes   Laundry with supervision   Room Organization independent

## 2019-03-14 PROCEDURE — 25000132 ZZH RX MED GY IP 250 OP 250 PS 637: Performed by: STUDENT IN AN ORGANIZED HEALTH CARE EDUCATION/TRAINING PROGRAM

## 2019-03-14 PROCEDURE — 12400001 ZZH R&B MH UMMC

## 2019-03-14 RX ADMIN — QUETIAPINE FUMARATE 50 MG: 50 TABLET ORAL at 22:45

## 2019-03-14 ASSESSMENT — ACTIVITIES OF DAILY LIVING (ADL)
ORAL_HYGIENE: INDEPENDENT
HYGIENE/GROOMING: INDEPENDENT
DRESS: INDEPENDENT
LAUNDRY: WITH SUPERVISION
HYGIENE/GROOMING: INDEPENDENT
LAUNDRY: WITH SUPERVISION
ORAL_HYGIENE: INDEPENDENT
DRESS: INDEPENDENT

## 2019-03-14 NOTE — PROGRESS NOTES
Pt ate breakfast and went to court early.       03/14/19 1100   Behavioral Health   Hallucinations appears responding   Thinking distractable;poor concentration   Orientation person: oriented;place: oriented;date: oriented   Memory baseline memory   Insight poor   Judgement impaired   Eye Contact at examiner   Affect blunted, flat   Mood anxious   Physical Appearance/Attire attire appropriate to age and situation   Hygiene well groomed   1. Wish to be Dead No   2. Non-Specific Active Suicidal Thoughts  No   Activity withdrawn;isolative   Speech clear   Psychomotor / Gait balanced;steady   Activities of Daily Living   Hygiene/Grooming independent   Oral Hygiene independent   Dress independent   Laundry with supervision

## 2019-03-14 NOTE — PLAN OF CARE
"Tosha agreed to talk privately to this nurse in her room. She said that she is willing to take seroquel, but not the risperdal. Said that there is a medical alert chip implanted in her that identifies that she cannot have risperdal. This is because it has \"1.5 of sulfa\" in it and \"my body will vaporize.\" She said that today the doctor had checked with her regarding her numbers and had verified that she could not have certain drugs. She added that if anyone did not take her seriously, they would lose their license.   "

## 2019-03-14 NOTE — PROGRESS NOTES
Writer received a message from Linh Weaver ([phone]) from [facility] who has made referrals for patient to be assigned an ACT team. Patient will interview with the [facility] ACT team on April 3rd for an available placement on their providers.

## 2019-03-14 NOTE — PROGRESS NOTES
"    ----------------------------------------------------------------------------------------------------------  [facility], [place]   Psychiatric Progress Note  Hospital Day #13     Interim History:   The patient's care was discussed with the treatment team and chart notes were reviewed.    Sleep: 6.5h  Scheduled Medications: Taken as prescribed  PRNs: None  Staff Report: No acute events overnight. Expressed concern about Risperdal containing \"1.5 of sulfa,\" and that if she took Risperdal \"my body will vaporize.\"    Patient was not interviewed today, as she was at court. Chart was reviewed.      Review of systems:     ROS was negative unless noted above.          Allergies:     Allergies   Allergen Reactions     Codeine Anaphylaxis     Morphine Anaphylaxis     Cardiac arrest     Darvocet [Propoxyphene N-Apap] Other (See Comments)     Affects kidneys     Divalproex Sodium-Fd&C Red #40 Hives and Swelling     Lithium Hives and Swelling     Penicillins Hives     Phenytoin Hives     Sulfa Drugs Hives     Also increases liver function     Topiramate Rash and Swelling            Psychiatric Examination:   /74 (BP Location: Right arm)   Pulse 73   Temp 98  F (36.7  C) (Oral)   Resp 12   Wt 103.4 kg (228 lb)   SpO2 97%   BMI 32.71 kg/m    Weight is 228 lbs 0 oz  Body mass index is 32.71 kg/m .    Patient was not interviewed today         Labs:   No results found for this or any previous visit (from the past 24 hour(s)).       Assessment    Diagnostic Impression: Tosha Mcnair is a 39 year old female with a history of bipolar affective disorder, autism, unspecified anxiety, and mild intellectual disability who presented on health and  hold to outside ED with concern from [facility]  and [facility] crisis for paranoia, delusions, disorganized behavior, and inability to care for self in the context of likely medication nonadherence. Biological factors " include medication nonadherance. Psychological factors include previous diagnoses of mood and neurodevelopmental disorders. Social factors include housing instability. The MSE on admission was notable for a malodorous, disheveled woman who endorses grandiose and paranoid delusions and demonstrates a disorganized, illogical thought process. The patient's presentation is most consistent at this time with psychosis NEC, rule out acute denisse with psychotic features.  It is also possible the patient's PTA phentermine may have contributed to her decompensation, however medication adherence prior to the hospitalization is questionable.    Hospital course: Tosha Mcnair was admitted to station 22 with  on a 72 hour hold. Her history indicated decline in function since expiration of commitment in February 2018.  Paperwork for commitment and [place] was filed 3/4 given concern for inability to care for self outside of hospital at this time.  PTA lamotrigine was held given evidence of poor adherence.  Declined treatment team recommendation for initiation of new mood stabilizer.  Risperidone 2 mg nightly initiated to target psychosis and denisse, however she declined this medication throughout the hospital stay. Quetiapine was started to target symptoms of denisse and psychosis.    Discontinued Medications (& Rationale):  - Risperidone, as the patient declined to take this medication    Medical course Patient was cleared medically  by the ED physician for inpatient psychiatry admission. UTox negative. CBC, CMP, lipids, TSH, Vitamin D grossly wnl. Lamotrigine level undetectable on admission.    Plan   Principal Diagnosis:   # Psychosis NEC, rule out acute denisse with psychotic features     Secondary psychiatric diagnoses of concern this admission:   # Bipolar affective disorder by history  # Unspecified anxiety by history  # Autism by history  # Mild intellectual disability (IQ 50-70) by history    Psychotropic  Medications:  Modify:    Continue:  - Quetiapine 50mg QHS  - Olanzapine 10 mg PO/IM Q2H PRN for agitation  - Hydroxyzine 25-50 Q6H PRN for anxiety  - Melatonin 3 mg at bedtime for sleep    Patient will be treated in therapeutic milieu with appropriate individual and group therapies as described.      Medical diagnoses to be addressed this admission:    # Asthma  -Continue PTA albuterol prn      # Hyperlipidemia, by history   Low HDL of 40, otherwise unremarkable.      # Hepatitis C carrier, by history  On review of chart, HCV antibody negative in 2017 at [facility].      # GI/FEN  - Mylanta PRN for indigestion  - Milk of Magnesia PRN for constipation    Data: As above  Consults: Nutrition    Legal Status: Court Hold    Safety Assessment:   Behavioral Orders   Procedures     Code 1 - Restrict to Unit     Elopement precautions     Routine Programming     As clinically indicated     Self Injury Precaution     Single Room     Status 15     Every 15 minutes.     Suicide precautions     Patients on Suicide Precautions should have a Combination Diet ordered that includes a Diet selection(s) AND a Behavioral Tray selection for Safe Tray - with utensils, or Safe Tray - NO utensils         Disposition: Unknown at this time, pending clinical stabilization and formulation of an appropriate treatment plan    This documentation accurately reflects the services I personally performed and treatment decisions made by me in consultation with the attending physician Jyoti Sousa MD.    Wilmer Wellington MD  Psychiatry PGY-1    ATTENDING ATTESTATION:  Jyoti Sousa MD    Patient was not seen by this writer today.

## 2019-03-15 PROCEDURE — 25000132 ZZH RX MED GY IP 250 OP 250 PS 637: Performed by: STUDENT IN AN ORGANIZED HEALTH CARE EDUCATION/TRAINING PROGRAM

## 2019-03-15 PROCEDURE — 12400001 ZZH R&B MH UMMC

## 2019-03-15 PROCEDURE — 99232 SBSQ HOSP IP/OBS MODERATE 35: CPT | Mod: GC | Performed by: PSYCHIATRY & NEUROLOGY

## 2019-03-15 RX ORDER — HALOPERIDOL 5 MG/ML
3 INJECTION INTRAMUSCULAR AT BEDTIME
Status: DISCONTINUED | OUTPATIENT
Start: 2019-03-15 | End: 2019-03-18

## 2019-03-15 RX ORDER — HALOPERIDOL 1 MG/1
3 TABLET ORAL AT BEDTIME
Status: DISCONTINUED | OUTPATIENT
Start: 2019-03-15 | End: 2019-03-18

## 2019-03-15 RX ADMIN — HALOPERIDOL 3 MG: 1 TABLET ORAL at 21:45

## 2019-03-15 ASSESSMENT — ACTIVITIES OF DAILY LIVING (ADL)
HYGIENE/GROOMING: INDEPENDENT;SHOWER
LAUNDRY: WITH SUPERVISION
LAUNDRY: WITH SUPERVISION
DRESS: INDEPENDENT
ORAL_HYGIENE: INDEPENDENT
ORAL_HYGIENE: INDEPENDENT
DRESS: INDEPENDENT
HYGIENE/GROOMING: INDEPENDENT

## 2019-03-15 NOTE — PROGRESS NOTES
CLINICAL NUTRITION SERVICES - REASSESSMENT NOTE     Nutrition Prescription    RECOMMENDATIONS FOR MDs/PROVIDERS TO ORDER:  None today    Malnutrition Status:    Patient does not meet two of the criteria necessary for diagnosing malnutrition     Recommendations already ordered by Registered Dietitian (RD):  2pm snack: sandwich (WW bread, roast beef, swiss or American cheese, onion, jordan) and applesauce    Future/Additional Recommendations:  Obtain nutrition history as able  Adjust snack as needed  Encourage increased Calcium intakes     EVALUATION OF THE PROGRESS TOWARD GOALS   Diet: Regular  Intake: Intakes variable here, as pt does not like a lot of the food offered.     NUTRITION HISTORY  Pt reports eating organic and natural foods at home, avoids boxed, pre-made products. Does not drink regular milk but does drink organic milk.   Reports good appetite PTA. Usual intakes of Breakfast: eggs and french toast, or crepes and egg benedict; Snack: dragonfruit, watermelon w/homemade caramel; Lunch: scallops, squid, shrimp; Snack: crackers, cookies (homemade); Dinner: steak, side salad, dessert  Takes liquid forms of L-carnitine, Vit B12, Vit D2, MVI at home     NEW FINDINGS   - Wt: Pt reports UBW of 179 lb, though question accuracy of this wt given wts this admit. Wt is stable here so far, though pt had stated she has gained 8 lb ?  - Labs: Calcium 8.4 (L)    MALNUTRITION  % Intake: Decreased intake does not meet criteria  % Weight Loss: None noted  Subcutaneous Fat Loss: None observed  Muscle Loss: None observed  Fluid Accumulation/Edema: None noted  Malnutrition Diagnosis: Patient does not meet two of the above criteria necessary for diagnosing malnutrition    Previous Goals   Patient to consume % of nutritionally adequate meal trays TID, or the equivalent with supplements/snacks.  Evaluation: Suspect met    Previous Nutrition Diagnosis  Predicted inadequate nutrient intake (protein-energy) related to variable  "appetite as evidenced by reliance on po intakes to meet estimated needs with potential for decline.     Evaluation: No change    CURRENT NUTRITION DIAGNOSIS  Predicted inadequate nutrient intake (protein-energy) related to variable appetite as evidenced by reliance on po intakes to meet estimated needs with potential for decline.       INTERVENTIONS  Implementation  - Discussed menu options available to patient - organic foods are not offered, but discussed foods that are available that may better fir into her preferences (eggs, yogurt, whole fruit, vegetables, sandwich, fish, etc). Pt had a reason against each of these (states HB eggs made her sick here, doesn't like yogurt if it's scooped out into bowl, fruit difficult to chew, vegetables make her have more gas, cod here is \"too fishy\") but was agreeable to sandwich.   - Pt requested Asian market food, stating that another patient gets this daily. Checked with diet office and this is not an option that's offered (though Asian cuisine is sometimes offered on menu), so unsure where pt is getting this information. Writer informed her that food can be brought in from outside.  - 2pm snack: sandwich (WW bread, roast beef, swiss or American cheese, onion, jordan) and applesauce    Goals  Patient to consume % of nutritionally adequate meal trays TID, or the equivalent with supplements/snacks.    Monitoring/Evaluation  Progress toward goals will be monitored and evaluated per protocol.    Victorina Lynch RD, LD  Unit pgr: [phone]   "

## 2019-03-15 NOTE — PROGRESS NOTES
Isolative to her room except to get tray and eat in her room. Blunted. Sachin at times.       03/14/19 1900   Behavioral Health   Hallucinations appears responding;other (see comment)  (Talking to self in her room)   Thinking distractable;poor concentration   Orientation person: oriented;place: oriented   Memory baseline memory   Insight poor   Judgement impaired   Eye Contact at examiner   Affect blunted, flat   Mood anxious   Physical Appearance/Attire attire appropriate to age and situation   Hygiene well groomed   1. Wish to be Dead No   2. Non-Specific Active Suicidal Thoughts  No   Activity withdrawn;isolative   Speech clear   Psychomotor / Gait balanced;steady   Psycho Education   Type of Intervention structured groups   Response refuses   Activities of Daily Living   Hygiene/Grooming independent   Oral Hygiene independent   Dress independent   Laundry with supervision   Room Organization independent

## 2019-03-15 NOTE — PROGRESS NOTES
Pt was isolative and withdrawn to her room, refused groups. Pt stated she was awaiting court documents but none were received. Pt appears responding in her room, uses hand gestures frequently, and anxious to discharge. Pt although denies hallucinations, SI/SIB, and medication side affects.      03/15/19 1341   Behavioral Health   Hallucinations appears responding   Thinking distractable;poor concentration   Orientation person: oriented;place: oriented   Memory baseline memory   Insight poor   Judgement impaired   Eye Contact at examiner   Affect blunted, flat   Mood anxious   Physical Appearance/Attire attire appropriate to age and situation   Hygiene well groomed   Suicidality other (see comments)  (denies)   1. Wish to be Dead No   2. Non-Specific Active Suicidal Thoughts  No   Activity isolative;withdrawn   Speech clear   Medication Sensitivity no stated side effects;no observed side effects   Psychomotor / Gait balanced;steady   Psycho Education   Type of Intervention 1:1 intervention   Response participates with encouragement   Hours 0.5   Treatment Detail check in    Activities of Daily Living   Hygiene/Grooming independent   Oral Hygiene independent   Dress independent   Laundry with supervision   Room Organization independent   Activity   Activity Assistance Provided independent

## 2019-03-15 NOTE — PLAN OF CARE
BEHAVIORAL TEAM DISCUSSION    Participants: Dr. Jaylan Wellington PGY-1  Crystal [facility]  Progress: Little improvement as patient continues to refuse appropriate medications.  Continued Stay Criteria/Rationale: Patient had her final civil commitment and [place] hearing yesterday. A final order has not been set in place by the court to help team inform care.   Medical/Physical: see psychiatry physician progress note for further details   Precautions:   Behavioral Orders   Procedures     Code 1 - Restrict to Unit     Elopement precautions     Routine Programming     As clinically indicated     Self Injury Precaution     Single Room     Status 15     Every 15 minutes.     Suicide precautions     Patients on Suicide Precautions should have a Combination Diet ordered that includes a Diet selection(s) AND a Behavioral Tray selection for Safe Tray - with utensils, or Safe Tray - NO utensils       Plan: Patient will continue to be offered medication as a [place]o order is not in place to force medications at this time.   Rationale for change in precautions or plan: No change has been indicated.

## 2019-03-16 PROCEDURE — 25000132 ZZH RX MED GY IP 250 OP 250 PS 637: Performed by: STUDENT IN AN ORGANIZED HEALTH CARE EDUCATION/TRAINING PROGRAM

## 2019-03-16 PROCEDURE — 12400001 ZZH R&B MH UMMC

## 2019-03-16 RX ADMIN — Medication 3.5 MG: at 21:24

## 2019-03-16 ASSESSMENT — ACTIVITIES OF DAILY LIVING (ADL)
HYGIENE/GROOMING: INDEPENDENT;SHOWER
DRESS: INDEPENDENT
LAUNDRY: WITH SUPERVISION
ORAL_HYGIENE: INDEPENDENT
ORAL_HYGIENE: INDEPENDENT
DRESS: INDEPENDENT
HYGIENE/GROOMING: INDEPENDENT

## 2019-03-16 NOTE — PROGRESS NOTES
Pt isolative to room, only visible in milieu for requests and to get meals.  Pt continues to present odd behaviors like laying in bed bottomless and doing hand motions like she's flicking something away.  Pt also observed doing what appears like hip lift exercises.  Pt requested snacks a few times throughout the day.         03/16/19 0600   Sleep/Rest/Relaxation   Night Time # Hours 6 hours   Behavioral Health   Hallucinations appears responding   Thinking distractable;poor concentration   Orientation person: oriented;place: oriented   Memory baseline memory   Insight poor   Judgement impaired   Eye Contact at examiner   Affect blunted, flat   Mood anxious   Physical Appearance/Attire attire appropriate to age and situation   Hygiene well groomed   Suicidality other (see comments)  (denies)   1. Wish to be Dead No   2. Non-Specific Active Suicidal Thoughts  No   Self Injury other (see comment)  (denies)   Activity isolative;withdrawn   Speech clear;coherent   Medication Sensitivity no observed side effects   Psychomotor / Gait balanced;steady   Psycho Education   Type of Intervention 1:1 intervention   Response observes from a distance   Activities of Daily Living   Hygiene/Grooming independent;shower   Oral Hygiene independent   Dress independent   Room Organization independent   Behavioral Health Interventions   Social and Therapeutic Interventions (Psychotic Symptoms) encourage socialization with peers;encourage participation in therapeutic groups and milieu activities

## 2019-03-16 NOTE — PROGRESS NOTES
Pt was isolative and withdrawn to her room, appears responding. Pt brought meals to her room, anxious about her housing situation. Pt states she is waiting for another fax on Monday. Pt reports that her brother Nick CINTRON is living in the room above her in this hospital. She stated he had contacted her and told her when her was admitted. Pt stated her brother Nick CINTRON would be transferred on to this unit due to a court order. Pt took her Robledo medication without incident.      03/15/19 2116   Behavioral Health   Hallucinations appears responding   Thinking distractable;poor concentration;delusional   Orientation person: oriented;place: oriented   Memory baseline memory   Insight poor   Judgement impaired   Eye Contact at examiner   Affect blunted, flat   Mood anxious   Physical Appearance/Attire attire appropriate to age and situation   Hygiene well groomed   Suicidality thoughts and plan  (denies)   1. Wish to be Dead No   2. Non-Specific Active Suicidal Thoughts  No   Activity withdrawn;isolative   Speech clear;rambling   Medication Sensitivity no stated side effects;no observed side effects   Psychomotor / Gait balanced;steady   Psycho Education   Type of Intervention 1:1 intervention   Response participates, initiates socially appropriate   Hours 0.5   Activities of Daily Living   Hygiene/Grooming independent;shower   Oral Hygiene independent   Dress independent   Laundry with supervision   Room Organization independent   Activity   Activity Assistance Provided independent

## 2019-03-17 PROCEDURE — 12400001 ZZH R&B MH UMMC

## 2019-03-17 PROCEDURE — 25000132 ZZH RX MED GY IP 250 OP 250 PS 637: Performed by: PSYCHIATRY & NEUROLOGY

## 2019-03-17 RX ORDER — HALOPERIDOL 0.5 MG/1
0.5 TABLET ORAL AT BEDTIME
Status: COMPLETED | OUTPATIENT
Start: 2019-03-17 | End: 2019-03-17

## 2019-03-17 RX ADMIN — HALOPERIDOL 0.5 MG: 0.5 TABLET ORAL at 20:45

## 2019-03-17 RX ADMIN — Medication 2.5 MG: at 20:44

## 2019-03-17 ASSESSMENT — ACTIVITIES OF DAILY LIVING (ADL)
LAUNDRY: WITH SUPERVISION
DRESS: INDEPENDENT
HYGIENE/GROOMING: INDEPENDENT
HYGIENE/GROOMING: INDEPENDENT
ORAL_HYGIENE: INDEPENDENT
ORAL_HYGIENE: INDEPENDENT
DRESS: INDEPENDENT

## 2019-03-17 NOTE — PLAN OF CARE
Patient still gesticulating in room and when walking in [place].  Pt does accept HS Haldol without problem.

## 2019-03-17 NOTE — PROGRESS NOTES
Psychiatry Cross Cover Note    S: Notified by staff that per pharmacy there are only 2 tablets of Haldol 1mg left. Therefore, Tosha, cannot receive exactly 3mg Haldol. Pharmacy can cut a 5mg tablet in half and combine it with a 1mg tablet to give her 3.5mg tonight.     O: /77 (BP Location: Right arm)   Pulse 69   Temp 98.3  F (36.8  C) (Oral)   Resp 16   Wt 103.4 kg (228 lb)   SpO2 97%   BMI 32.71 kg/m      A/P: Tosha Mcnair is a 38yo F with PMH of BPAD, autism, anxiety, and mild intellectal disability admitted with psychosis.     -Give Haldol 3.5mg once tonight, resume 3mg tomorrow once pharmacy has sufficient tablets.     Paige Garrison MD  PGY-2 Psychiatry Resident

## 2019-03-17 NOTE — PROGRESS NOTES
Pt calm and cooperative. Pt showered, no visitors and ate well from trays. Pt stayed in room most of shifts, ate her meals in her room. Pt observed picking motions int he air and off her face when on objects seemed to be present.      03/16/19 2148   Behavioral Health   Hallucinations appears responding   Thinking distractable   Orientation person: oriented   Memory baseline memory   Insight poor   Judgement impaired   Eye Contact at examiner   Affect blunted, flat   Mood anxious   Physical Appearance/Attire attire appropriate to age and situation   Hygiene well groomed;other (see comment)  (showered)   Suicidality other (see comments)  (denies)   1. Wish to be Dead No   2. Non-Specific Active Suicidal Thoughts  No   Self Injury other (see comment)  (no)   Elopement (no)   Activity isolative   Speech clear;coherent   Medication Sensitivity no stated side effects   Psychomotor / Gait balanced   Psycho Education   Type of Intervention 1:1 intervention   Response observes from a distance   Hours 0.5   Activities of Daily Living   Hygiene/Grooming independent   Oral Hygiene independent   Dress independent   Laundry with supervision   Room Organization independent

## 2019-03-17 NOTE — PLAN OF CARE
Tosha out of room more today-was observed abruptly moving hands as though sprinkling something in the air again-stated she is just releasing energy and this is therapeutic for her-mentioned something about communicating with her rhea and then on to describe personality of rhea-continues pressured rambling speech-states she speaks 132 languages and then verbalizes several unintelligible statements that do not sound like foreign language-states her daughter gets frustrated in school because she speaks 9 languages and is smarter than the teacher, but must go by the instruction of a less intelligent person-very pleasant in 1:1-speaks of social interactions with peers, but mininal interactions observed

## 2019-03-18 PROCEDURE — 99232 SBSQ HOSP IP/OBS MODERATE 35: CPT | Mod: GC | Performed by: PSYCHIATRY & NEUROLOGY

## 2019-03-18 PROCEDURE — 25000132 ZZH RX MED GY IP 250 OP 250 PS 637: Performed by: STUDENT IN AN ORGANIZED HEALTH CARE EDUCATION/TRAINING PROGRAM

## 2019-03-18 PROCEDURE — 12400001 ZZH R&B MH UMMC

## 2019-03-18 RX ORDER — HALOPERIDOL 5 MG/ML
5 INJECTION INTRAMUSCULAR AT BEDTIME
Status: DISCONTINUED | OUTPATIENT
Start: 2019-03-18 | End: 2019-03-26

## 2019-03-18 RX ORDER — HALOPERIDOL 5 MG/ML
3 INJECTION INTRAMUSCULAR AT BEDTIME
Status: DISCONTINUED | OUTPATIENT
Start: 2019-03-18 | End: 2019-03-18

## 2019-03-18 RX ORDER — HALOPERIDOL 5 MG/1
5 TABLET ORAL AT BEDTIME
Status: DISCONTINUED | OUTPATIENT
Start: 2019-03-18 | End: 2019-03-26

## 2019-03-18 RX ADMIN — HALOPERIDOL 5 MG: 5 TABLET ORAL at 20:07

## 2019-03-18 ASSESSMENT — ACTIVITIES OF DAILY LIVING (ADL)
HYGIENE/GROOMING: INDEPENDENT
HYGIENE/GROOMING: INDEPENDENT
ORAL_HYGIENE: INDEPENDENT
DRESS: SCRUBS (BEHAVIORAL HEALTH)
DRESS: SCRUBS (BEHAVIORAL HEALTH)
ORAL_HYGIENE: INDEPENDENT

## 2019-03-18 NOTE — PROGRESS NOTES
Pt keeps to self, calm and appropriate, continues to make movements with hands in air, although denies responding. Lacks insight, denies symptoms.      03/17/19 2200   Behavioral Health   Hallucinations appears responding   Thinking distractable;delusional   Orientation person: oriented;place: oriented;date: oriented;time: oriented   Memory baseline memory   Insight denial of illness   Judgement impaired   Eye Contact at examiner   Affect full range affect   Mood mood is calm   Physical Appearance/Attire attire appropriate to age and situation   Hygiene other (see comment)  (fair )   Suicidality other (see comments)  (denies )   1. Wish to be Dead No   2. Non-Specific Active Suicidal Thoughts  No   Self Injury other (see comment)  (denies )   Elopement (none this shift )   Activity isolative   Speech clear;coherent   Medication Sensitivity no stated side effects;no observed side effects   Psychomotor / Gait balanced;steady   Psycho Education   Type of Intervention 1:1 intervention   Response participates, initiates socially appropriate   Treatment Detail check in    Activities of Daily Living   Hygiene/Grooming independent   Oral Hygiene independent   Dress independent   Room Organization independent   Activity   Activity Assistance Provided independent

## 2019-03-18 NOTE — PROGRESS NOTES
Pt appears to be withdrawn and responding to internal stimuli. She spent a lot of the morning in her room. Pt shows no signs of SI/SIB.        03/18/19 1300   Behavioral Health   Hallucinations appears responding   Thinking distractable;delusional   Orientation person: oriented   Memory baseline memory   Insight poor   Judgement impaired   Affect blunted, flat   Mood mood is calm   Physical Appearance/Attire appears stated age   Hygiene well groomed   Activities of Daily Living   Hygiene/Grooming independent   Oral Hygiene independent   Dress scrubs (behavioral health)   Room Organization independent

## 2019-03-18 NOTE — PROGRESS NOTES
"    ----------------------------------------------------------------------------------------------------------  [facility], [place]   Psychiatric Progress Note  Hospital Day #17     Interim History:   The patient's care was discussed with the treatment team and chart notes were reviewed.    Sleep: 6.75h  Scheduled Medications: Taken as prescribed  PRNs: None  Staff Report: No acute events overnight.     Patient Interview: Patient was interviewed in the patient's room on [facility]. She denied medication side effects, including stiffness, tiredness, or restlessness. She stated she had trouble sleeping last night due to her neighbor having a behavioral escalation. She was counseled on increasing the dose of her Haldol, and she was agreeable. She asked about discharge planning. She had no additional concerns or questions at this time.    The risks, benefits, alternatives and side effects of any medication changes have been discussed and are understood by the patient and other caregivers.    Review of systems:     ROS was negative unless noted above.          Allergies:     Allergies   Allergen Reactions     Codeine Anaphylaxis     Morphine Anaphylaxis     Cardiac arrest     Darvocet [Propoxyphene N-Apap] Other (See Comments)     Affects kidneys     Divalproex Sodium-Fd&C Red #40 Hives and Swelling     Lithium Hives and Swelling     Penicillins Hives     Phenytoin Hives     Sulfa Drugs Hives     Also increases liver function     Topiramate Rash and Swelling            Psychiatric Examination:   /77 (BP Location: Right arm)   Pulse 69   Temp 97.4  F (36.3  C) (Oral)   Resp 14   Wt 103.4 kg (228 lb)   SpO2 98%   BMI 32.71 kg/m    Weight is 228 lbs 0 oz  Body mass index is 32.71 kg/m .    Appearance:  awake, alert and dressed in hospital scrubs  Attitude:  cooperative  Eye Contact:  good  Mood:  \"fine\"  Affect:  mood congruent, constricted  Speech:  rapid speech, but " interruptable  Psychomotor Behavior:  no evidence of tardive dyskinesia, dystonia, or tics  Thought Process:  Generally logical, linear, and goal oriented  Associations:  no loose associations  Thought Content:  no evidence of suicidal ideation or homicidal ideation  Insight:  fair  Judgment:  fair  Oriented to:  time, person, and place  Attention Span and Concentration:  intact  Recent and Remote Memory:  intact  Language: speaks fluent English  Fund of Knowledge: appropriate  Muscle Strength and Tone: normal  Gait and Station: Normal         Labs:   No results found for this or any previous visit (from the past 24 hour(s)).       Assessment    Diagnostic Impression: Tosha Mcnair is a 39 year old female with a history of bipolar affective disorder, autism, unspecified anxiety, and mild intellectual disability who presented on health and  hold to outside ED with concern from [facility]  and [facility] crisis for paranoia, delusions, disorganized behavior, and inability to care for self in the context of likely medication nonadherence. Biological factors include medication nonadherance. Psychological factors include previous diagnoses of mood and neurodevelopmental disorders. Social factors include housing instability. The MSE on admission was notable for a malodorous, disheveled woman who endorses grandiose and paranoid delusions and demonstrates a disorganized, illogical thought process. The patient's presentation is most consistent at this time with psychosis NEC, rule out acute denisse with psychotic features.  It is also possible the patient's PTA phentermine may have contributed to her decompensation, however medication adherence prior to the hospitalization is questionable.    Hospital course: Tosha Mcnair was admitted to station 22 with  on a 72 hour hold. Her history indicated decline in function since expiration of commitment in February 2018.  Paperwork for  commitment and [place]o was filed 3/4 given concern for inability to care for self outside of hospital at this time.  PTA lamotrigine was held given evidence of poor adherence.  Declined treatment team recommendation for initiation of new mood stabilizer.  Risperidone 2 mg nightly initiated to target psychosis and denisse, however she declined this medication throughout the hospital stay. Quetiapine was started to target symptoms of denisse and psychosis. She was later cross-titrated to Haldol to target her symptoms.    Discontinued Medications (& Rationale):  - Risperidone, as the patient declined to take this medication  - Quetiapine, favoring Haldol    Medical course Patient was cleared medically  by the ED physician for inpatient psychiatry admission. UTox negative. CBC, CMP, lipids, TSH, Vitamin D grossly wnl. Lamotrigine level undetectable on admission.    Plan   Principal Diagnosis:   # Psychosis NEC, rule out acute denisse with psychotic features     Secondary psychiatric diagnoses of concern this admission:   # Bipolar affective disorder by history  # Unspecified anxiety by history  # Autism by history  # Mild intellectual disability (IQ 50-70) by history    Psychotropic Medications:  Modify:  - Increase Haldol to 5mg PO or IM at bedtime    Continue:  - Olanzapine 10 mg PO/IM Q2H PRN for agitation  - Hydroxyzine 25-50 Q6H PRN for anxiety  - Melatonin 3 mg at bedtime for sleep    Patient will be treated in therapeutic milieu with appropriate individual and group therapies as described.      Medical diagnoses to be addressed this admission:    # Asthma  -Continue PTA albuterol prn      # Hyperlipidemia, by history  Low HDL of 40, otherwise unremarkable.      # Hepatitis C carrier, by history  On review of chart, HCV antibody negative in 2017 at [facility].      # GI/FEN  - Mylanta PRN for indigestion  - Milk of Magnesia PRN for constipation    Data: As above  Consults: Nutrition    Legal Status:  [place]  Committed    Safety Assessment:   Behavioral Orders   Procedures     Code 1 - Restrict to Unit     Elopement precautions     Routine Programming     As clinically indicated     Self Injury Precaution     Single Room     Status 15     Every 15 minutes.     Suicide precautions     Patients on Suicide Precautions should have a Combination Diet ordered that includes a Diet selection(s) AND a Behavioral Tray selection for Safe Tray - with utensils, or Safe Tray - NO utensils         Disposition: Unknown at this time, pending clinical stabilization and formulation of an appropriate treatment plan    This documentation accurately reflects the services I personally performed and treatment decisions made by me in consultation with the attending physician Jaylan Jane MD.    Wilmer Wellington MD  Psychiatry PGY-1  [phone]    Attestation:  This patient has been seen and evaluated by me, Jaylan Jane.  I have discussed this patient with the house staff team including the resident and medical student and I agree with the findings and plan in this note.    I have reviewed today's vital signs, medications, labs and imaging. Jaylan Jane MD

## 2019-03-19 PROCEDURE — 99232 SBSQ HOSP IP/OBS MODERATE 35: CPT | Mod: GC | Performed by: PSYCHIATRY & NEUROLOGY

## 2019-03-19 PROCEDURE — 12400001 ZZH R&B MH UMMC

## 2019-03-19 PROCEDURE — 25000132 ZZH RX MED GY IP 250 OP 250 PS 637: Performed by: STUDENT IN AN ORGANIZED HEALTH CARE EDUCATION/TRAINING PROGRAM

## 2019-03-19 RX ORDER — CALCIUM CARBONATE 500 MG/1
500 TABLET, CHEWABLE ORAL DAILY PRN
Status: DISCONTINUED | OUTPATIENT
Start: 2019-03-19 | End: 2019-04-01 | Stop reason: HOSPADM

## 2019-03-19 RX ADMIN — HALOPERIDOL 5 MG: 5 TABLET ORAL at 20:55

## 2019-03-19 RX ADMIN — CALCIUM CARBONATE (ANTACID) CHEW TAB 500 MG 500 MG: 500 CHEW TAB at 18:24

## 2019-03-19 ASSESSMENT — ACTIVITIES OF DAILY LIVING (ADL)
HYGIENE/GROOMING: INDEPENDENT
DRESS: INDEPENDENT;SCRUBS (BEHAVIORAL HEALTH)
DRESS: INDEPENDENT
ORAL_HYGIENE: INDEPENDENT
LAUNDRY: UNABLE TO COMPLETE
LAUNDRY: WITH SUPERVISION
ORAL_HYGIENE: INDEPENDENT
HYGIENE/GROOMING: INDEPENDENT

## 2019-03-19 NOTE — PROGRESS NOTES
Patient will interview with [facility] ACT Team 3/27 at 2:15pm for an opening on their team.   Patient has been assigned a housing worker to help her secure a new apartment her name is Bryanna- [phone] who will come out to see patient when she is more stable.

## 2019-03-19 NOTE — PROGRESS NOTES
Pt isolative most the the day shift, napped throughout the morning. Pt did not attend any groups, mostly withdrawn from peers. Pt denied appears to be responding to internal stimuli, guarded during check-in. Denies SI/SIB.       03/19/19 1113   Behavioral Health   Hallucinations appears responding   Thinking distractable   Orientation person: oriented   Memory other (see comment)  (leon)   Insight poor   Judgement impaired   Eye Contact at examiner;at floor   Affect blunted, flat   Mood mood is calm   Physical Appearance/Attire attire appropriate to age and situation   Hygiene neglected grooming - unclean body, hair, teeth   Suicidality other (see comments)  (denies)   1. Wish to be Dead No   2. Non-Specific Active Suicidal Thoughts  No   Self Injury other (see comment)  (denies)   Elopement (none)   Activity withdrawn;isolative   Speech clear;coherent   Psychomotor / Gait balanced;steady   Psycho Education   Type of Intervention 1:1 intervention   Response participates, initiates socially appropriate   Hours 0.5   Treatment Detail check in   Activities of Daily Living   Hygiene/Grooming independent   Oral Hygiene independent   Dress independent   Laundry unable to complete   Room Organization independent

## 2019-03-19 NOTE — PROGRESS NOTES
"Pt was observed withdrawn to their room most of the evening making motions with their hands and reading, and was only observed to come out to get their dinner tray and coffee. While getting coffee in the lounge pt was observed talking to themselves and making hand motions. During check-in with writer pt stated that they are doing much better and feel that the medication has been effective in bringing their blood pressure down. Pt stated that they are likely discharging next week back to their apartment. Pt then spontaneously began talking about their father and stated that they were in this building as well as another building. When asked to clarify pt stated that they have three fathers. When asked if some were stepfathers pt stated \"no my mom was with them all\". Pt went on to explain their hand movements as \"getting rid of energy\" and denied SI and SIB.       03/18/19 2048   Behavioral Health   Hallucinations appears responding   Thinking distractable;delusional   Orientation person: oriented   Memory baseline memory   Insight poor   Judgement impaired   Eye Contact at examiner;at floor   Affect blunted, flat   Mood mood is calm   Physical Appearance/Attire attire appropriate to age and situation   Hygiene well groomed  (Showered)   Suicidality other (see comments)  (Pt denies)   1. Wish to be Dead No   2. Non-Specific Active Suicidal Thoughts  No   Elopement (None stated or observed)   Activity withdrawn;isolative   Speech clear;coherent   Medication Sensitivity sedation   Psychomotor / Gait balanced;steady   Psycho Education   Type of Intervention 1:1 intervention   Response participates with encouragement   Hours 0.5   Treatment Detail Check-in   Activities of Daily Living   Hygiene/Grooming independent   Oral Hygiene independent   Dress scrubs (behavioral health)   Room Organization independent     "

## 2019-03-19 NOTE — PROGRESS NOTES
"    ----------------------------------------------------------------------------------------------------------  [facility], [place]   Psychiatric Progress Note  Hospital Day #18     Interim History:   The patient's care was discussed with the treatment team and chart notes were reviewed.    Sleep: 7h  Scheduled Medications: Taken as prescribed  PRNs: None  Staff Report: No acute events overnight. Continues to respond to internal stimuli and make irrational comments. Making hand movements frequently \"getting rid of energy.\"    Patient Interview: Patient was interviewed in the patient's room on Station 22. She states that she is feeling \"clearer than when I first came in.\" She denied any side effects from her medications. She stated \"I think the medication is helping me.\" She stated that her major concern was \"having high blood pressure with the med,\" and that she was happy this had not happened since starting Haldol. We discussed the possibility of staring the COLE version of this medication, and she stated that she had already received this medication in the emergency department and that she could not have it again. She had no further questions or concerns.    The risks, benefits, alternatives and side effects of any medication changes have been discussed and are understood by the patient and other caregivers.    Review of systems:     ROS was negative unless noted above.          Allergies:     Allergies   Allergen Reactions     Codeine Anaphylaxis     Morphine Anaphylaxis     Cardiac arrest     Darvocet [Propoxyphene N-Apap] Other (See Comments)     Affects kidneys     Divalproex Sodium-Fd&C Red #40 Hives and Swelling     Lithium Hives and Swelling     Penicillins Hives     Phenytoin Hives     Sulfa Drugs Hives     Also increases liver function     Topiramate Rash and Swelling            Psychiatric Examination:   /56 (BP Location: Left arm)   Pulse 73   Temp 98  F (36.7  C) " "(Oral)   Resp 14   Wt 103.4 kg (228 lb)   SpO2 98%   BMI 32.71 kg/m    Weight is 228 lbs 0 oz  Body mass index is 32.71 kg/m .    Appearance:  awake, alert and dressed in hospital scrubs  Attitude:  cooperative  Eye Contact:  good  Mood:  \"doing better\"  Affect:  mood congruent, less constricted  Speech:  rapid speech, but interruptable  Psychomotor Behavior:  no evidence of tardive dyskinesia, dystonia, or tics  Thought Process:  Generally linear, and goal oriented, some illogical statements made  Associations:  no loose associations  Thought Content:  no evidence of suicidal ideation or homicidal ideation and evidence of disorganization present  Insight:  fair  Judgment:  fair  Oriented to:  time, person, and place  Attention Span and Concentration:  intact  Recent and Remote Memory:  intact  Language: speaks fluent English  Fund of Knowledge: appropriate  Muscle Strength and Tone: normal  Gait and Station: Normal         Labs:   No results found for this or any previous visit (from the past 24 hour(s)).       Assessment    Diagnostic Impression: Tosha Mcnair is a 39 year old female with a history of bipolar affective disorder, autism, unspecified anxiety, and mild intellectual disability who presented on health and  hold to outside ED with concern from [facility]  and [facility] crisis for paranoia, delusions, disorganized behavior, and inability to care for self in the context of likely medication nonadherence. Biological factors include medication nonadherance. Psychological factors include previous diagnoses of mood and neurodevelopmental disorders. Social factors include housing instability. The MSE on admission was notable for a malodorous, disheveled woman who endorses grandiose and paranoid delusions and demonstrates a disorganized, illogical thought process. The patient's presentation is most consistent at this time with psychosis NEC, rule out acute ednisse with " psychotic features.  It is also possible the patient's PTA phentermine may have contributed to her decompensation, however medication adherence prior to the hospitalization is questionable.    Hospital course: Tosha Mcnair was admitted to station 22 with  on a 72 hour hold. Her history indicated decline in function since expiration of commitment in February 2018.  Paperwork for commitment and [place] was filed 3/4 given concern for inability to care for self outside of hospital at this time.  PTA lamotrigine was held given evidence of poor adherence.  Declined treatment team recommendation for initiation of new mood stabilizer.  Risperidone 2 mg nightly initiated to target psychosis and denisse, however she declined this medication throughout the hospital stay. Quetiapine was started to target symptoms of denisse and psychosis. She was later cross-titrated to Haldol to target her symptoms.    Discontinued Medications (& Rationale):  - Risperidone, as the patient declined to take this medication  - Quetiapine, favoring Haldol    Medical course Patient was cleared medically  by the ED physician for inpatient psychiatry admission. UTox negative. CBC, CMP, lipids, TSH, Vitamin D grossly wnl. Lamotrigine level undetectable on admission.    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      Medical diagnoses to be addressed this admission:    #  Asthma  - Continue PTA albuterol prn      # Hyperlipidemia, by history  Low HDL of 40, otherwise unremarkable.      # Hepatitis C carrier, by history  On review of chart, HCV antibody negative in 2017 at [facility].      # GI/FEN  - Mylanta PRN for indigestion  - Milk of Magnesia PRN for constipation    Data: As above  Consults: Nutrition    Legal Status: [place]  Committed    Safety Assessment:   Behavioral Orders   Procedures     Code 1 - Restrict to Unit     Elopement precautions     Routine Programming     As clinically indicated     Self Injury Precaution     Single Room     Status 15     Every 15 minutes.     Suicide precautions     Patients on Suicide Precautions should have a Combination Diet ordered that includes a Diet selection(s) AND a Behavioral Tray selection for Safe Tray - with utensils, or Safe Tray - NO utensils         Disposition: Unknown at this time, pending clinical stabilization and formulation of an appropriate treatment plan    This documentation accurately reflects the services I personally performed and treatment decisions made by me in consultation with the attending physician Arden Stafford MD.    Wilmer Wellington MD  Psychiatry PGY-1  [phone]    Psychiatry Attending Attestation:  This patient has been seen and evaluated by me, Arden Stafford M.D.  The patient's condition and treatment plan were discussed with the resident, and care coordinated with the CTC and RN. I reviewed, edited and agree with the findings and plan in this note.     Arden Stafford M.D.   of Psychiatry

## 2019-03-20 PROCEDURE — 25000132 ZZH RX MED GY IP 250 OP 250 PS 637: Performed by: STUDENT IN AN ORGANIZED HEALTH CARE EDUCATION/TRAINING PROGRAM

## 2019-03-20 PROCEDURE — 12400001 ZZH R&B MH UMMC

## 2019-03-20 PROCEDURE — 99232 SBSQ HOSP IP/OBS MODERATE 35: CPT | Mod: GC | Performed by: PSYCHIATRY & NEUROLOGY

## 2019-03-20 RX ADMIN — HALOPERIDOL 5 MG: 5 TABLET ORAL at 20:24

## 2019-03-20 ASSESSMENT — ACTIVITIES OF DAILY LIVING (ADL)
HYGIENE/GROOMING: INDEPENDENT
ORAL_HYGIENE: INDEPENDENT
DRESS: SCRUBS (BEHAVIORAL HEALTH)
DRESS: SCRUBS (BEHAVIORAL HEALTH)
HYGIENE/GROOMING: INDEPENDENT
LAUNDRY: WITH SUPERVISION
ORAL_HYGIENE: INDEPENDENT

## 2019-03-20 NOTE — PROGRESS NOTES
03/19/19 2011   Behavioral Health   Hallucinations appears responding   Thinking distractable;delusional   Orientation time: oriented;date: oriented;place: oriented;person: oriented   Memory other (see comment)  (UA)   Insight poor   Judgement impaired   Eye Contact at examiner   Affect blunted, flat;full range affect   Mood mood is calm   Physical Appearance/Attire neat;attire appropriate to age and situation;appears stated age   Hygiene neglected grooming - unclean body, hair, teeth   Suicidality other (see comments)  (None)   1. Wish to be Dead No   2. Non-Specific Active Suicidal Thoughts  No   Self Injury other (see comment)  (None)   Activity withdrawn;isolative;other (see comment)  (reads Bible )   Speech clear;coherent   Medication Sensitivity no stated side effects;no observed side effects   Psychomotor / Gait steady;balanced     Pt. Appears responding in room. Pt. Shared reads the Bible and the Bible talks back. Pt. Made a comment that she has to be careful of that one and pointed to another book. Pt. Shared that many people have provided feedback about hallucinations, but Pt. Continues to deny and shared goes to Bible study.

## 2019-03-20 NOTE — PLAN OF CARE
Patient pleasant and cooperative, stayed in room most of the shift.  Flat affect, denies SI/SIB, denies depression/anxiety.  Patient c/o right ear lobe pain, slight swollen and tender to touch.  MD notified, patient to see internal medicine.  Patient currently awake in room, will continue to monitor closely.

## 2019-03-20 NOTE — PROGRESS NOTES
"    ----------------------------------------------------------------------------------------------------------  [facility], [place]   Psychiatric Progress Note  Hospital Day #19     Interim History:   The patient's care was discussed with the treatment team and chart notes were reviewed.    Sleep: 7h  Scheduled Medications: Taken as prescribed  PRNs: None  Staff Report: No acute events overnight. Spent much of the evening reading the bible.    Patient Interview: Patient was interviewed in the patient's room on Station 22. She denied medication side effects. She stated she went to one group and has been out in the common area throughout the day. She states the Haldol continues to be helpful. We discussed the possibility of starting COLE Haldol Decanoate, and she stated \"I cant have anything with steroids in it.\" We discussed plans for discharge, and a future interview with and ACT team. She had no additional concerns or questions at this time.    The risks, benefits, alternatives and side effects of any medication changes have been discussed and are understood by the patient and other caregivers.    Review of systems:     ROS was negative unless noted above.          Allergies:     Allergies   Allergen Reactions     Codeine Anaphylaxis     Morphine Anaphylaxis     Cardiac arrest     Darvocet [Propoxyphene N-Apap] Other (See Comments)     Affects kidneys     Divalproex Sodium-Fd&C Red #40 Hives and Swelling     Lithium Hives and Swelling     Penicillins Hives     Phenytoin Hives     Sulfa Drugs Hives     Also increases liver function     Topiramate Rash and Swelling            Psychiatric Examination:   /78   Pulse 63   Temp 98.4  F (36.9  C)   Resp 16   Wt 103.9 kg (229 lb)   SpO2 98%   BMI 32.86 kg/m    Weight is 229 lbs 0 oz  Body mass index is 32.86 kg/m .    Appearance:  awake, alert and dressed in hospital scrubs  Attitude:  cooperative  Eye Contact:  good  Mood:  \"good, " "just relaxing\"  Affect:  mood congruent, less constricted  Speech:  rapid speech, but interruptable  Psychomotor Behavior:  no evidence of tardive dyskinesia, dystonia, or tics  Thought Process:  Generally linear, and goal oriented, some illogical statements made  Associations:  no loose associations  Thought Content:  no evidence of suicidal ideation or homicidal ideation and there is evidence of disorganization and paranoid thought process (believing Haldol Decanoate contains steroids)  Insight:  fair  Judgment:  fair  Oriented to:  time, person, and place  Attention Span and Concentration:  intact  Recent and Remote Memory:  intact  Language: speaks fluent English  Fund of Knowledge: appropriate  Muscle Strength and Tone: normal  Gait and Station: Normal         Labs:   No results found for this or any previous visit (from the past 24 hour(s)).       Assessment    Diagnostic Impression: Tosha Mcnair is a 39 year old female with a history of bipolar affective disorder, autism, unspecified anxiety, and mild intellectual disability who presented on health and  hold to outside ED with concern from [facility]  and [facility] crisis for paranoia, delusions, disorganized behavior, and inability to care for self in the context of likely medication nonadherence. Biological factors include medication nonadherance. Psychological factors include previous diagnoses of mood and neurodevelopmental disorders. Social factors include housing instability. The MSE on admission was notable for a malodorous, disheveled woman who endorses grandiose and paranoid delusions and demonstrates a disorganized, illogical thought process. The patient's presentation is most consistent at this time with psychosis NEC, rule out acute denisse with psychotic features.  It is also possible the patient's PTA phentermine may have contributed to her decompensation, however medication adherence prior to the hospitalization " is questionable.    Hospital course: Tosha Mcnair was admitted to station 22 with  on a 72 hour hold. Her history indicated decline in function since expiration of commitment in February 2018.  Paperwork for commitment and [place] was filed 3/4 given concern for inability to care for self outside of hospital at this time.  PTA lamotrigine was held given evidence of poor adherence.  Declined treatment team recommendation for initiation of new mood stabilizer.  Risperidone 2 mg nightly initiated to target psychosis and denisse, however she declined this medication throughout the hospital stay. Quetiapine was started to target symptoms of denisse and psychosis. She was later cross-titrated to Haldol to target her symptoms with improvement in thought process.    Discontinued Medications (& Rationale):  - Risperidone, as the patient declined to take this medication  - Quetiapine, favoring Haldol    Medical course Patient was cleared medically  by the ED physician for inpatient psychiatry admission. UTox negative. CBC, CMP, lipids, TSH, Vitamin D grossly wnl. Lamotrigine level undetectable on admission.    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      Medical diagnoses to be addressed this admission:    # Asthma  - Continue PTA albuterol prn      # Hyperlipidemia, by history  Low HDL of 40, otherwise unremarkable.      # Hepatitis C  carrier, by history  On review of chart, HCV antibody negative in 2017 at [facility].      # GI/FEN  - Mylanta PRN for indigestion  - Milk of Magnesia PRN for constipation    Data: As above  Consults: Nutrition    Legal Status: [place]  Committed    Safety Assessment:   Behavioral Orders   Procedures     Code 1 - Restrict to Unit     Elopement precautions     Routine Programming     As clinically indicated     Self Injury Precaution     Single Room     Status 15     Every 15 minutes.     Suicide precautions     Patients on Suicide Precautions should have a Combination Diet ordered that includes a Diet selection(s) AND a Behavioral Tray selection for Safe Tray - with utensils, or Safe Tray - NO utensils         Disposition: Unknown at this time, pending clinical stabilization and formulation of an appropriate treatment plan    This documentation accurately reflects the services I personally performed and treatment decisions made by me in consultation with the attending physician Arden Stafford MD.    Wilmer Wellington MD  Psychiatry PGY-1  [phone]    Psychiatry Attending Attestation:  This patient has been seen and evaluated by me, Arden Stafford M.D.  The patient's condition and treatment plan were discussed with the resident, and care coordinated with the CTC and RN. I reviewed, edited and agree with the findings and plan in this note.     Arden Stafford M.D.   of Psychiatry

## 2019-03-21 PROCEDURE — 99207 ZZC CONSULT E&M CHANGED TO INITIAL LEVEL: CPT | Performed by: PHYSICIAN ASSISTANT

## 2019-03-21 PROCEDURE — 12400001 ZZH R&B MH UMMC

## 2019-03-21 PROCEDURE — 25000132 ZZH RX MED GY IP 250 OP 250 PS 637: Performed by: STUDENT IN AN ORGANIZED HEALTH CARE EDUCATION/TRAINING PROGRAM

## 2019-03-21 PROCEDURE — 25000132 ZZH RX MED GY IP 250 OP 250 PS 637: Performed by: PHYSICIAN ASSISTANT

## 2019-03-21 PROCEDURE — 99222 1ST HOSP IP/OBS MODERATE 55: CPT | Performed by: PHYSICIAN ASSISTANT

## 2019-03-21 RX ORDER — CIPROFLOXACIN AND DEXAMETHASONE 3; 1 MG/ML; MG/ML
4 SUSPENSION/ DROPS AURICULAR (OTIC) 2 TIMES DAILY
Status: COMPLETED | OUTPATIENT
Start: 2019-03-21 | End: 2019-03-28

## 2019-03-21 RX ADMIN — ACETAMINOPHEN 650 MG: 325 TABLET, FILM COATED ORAL at 11:44

## 2019-03-21 RX ADMIN — HALOPERIDOL 5 MG: 5 TABLET ORAL at 20:52

## 2019-03-21 RX ADMIN — CIPROFLOXACIN AND DEXAMETHASONE 4 DROP: 3; 1 SUSPENSION/ DROPS AURICULAR (OTIC) at 18:32

## 2019-03-21 ASSESSMENT — ACTIVITIES OF DAILY LIVING (ADL)
HYGIENE/GROOMING: INDEPENDENT
ORAL_HYGIENE: INDEPENDENT
HYGIENE/GROOMING: INDEPENDENT
DRESS: INDEPENDENT
LAUNDRY: WITH SUPERVISION
ORAL_HYGIENE: INDEPENDENT
DRESS: INDEPENDENT

## 2019-03-21 NOTE — PROGRESS NOTES
03/20/19 7461   Significant Event   Significant Event Other (see comments)  (shift summary)     Pt was visible in the milieu, social with peers, watched TV, and ate dinner.  Appeared calm, and cooperative.  No stated SI, SIB or hallucination.

## 2019-03-21 NOTE — PROGRESS NOTES
"    ----------------------------------------------------------------------------------------------------------  [facility], [place]   Psychiatric Progress Note  Hospital Day #20     Interim History:   The patient's care was discussed with the treatment team and chart notes were reviewed.    Sleep: 7h  Scheduled Medications: Taken as prescribed  PRNs: None  Staff Report: No acute events overnight. Described right ear pain.    Patient Interview: Patient was interviewed in the patient's room on Station 22. She endorses ongoing right ear pain. She stated today that her ear was now painful \"inside\" We discussed that IM will be seeing her today. She denied medication side effects. We discussed Haldol Decanoate, and she stated \"I'll think about it.\" She had no additional concerns or questions at this time.    The risks, benefits, alternatives and side effects of any medication changes have been discussed and are understood by the patient and other caregivers.    Review of systems:     ROS was negative unless noted above.          Allergies:     Allergies   Allergen Reactions     Codeine Anaphylaxis     Morphine Anaphylaxis     Cardiac arrest     Darvocet [Propoxyphene N-Apap] Other (See Comments)     Affects kidneys     Divalproex Sodium-Fd&C Red #40 Hives and Swelling     Lithium Hives and Swelling     Penicillins Hives     Phenytoin Hives     Sulfa Drugs Hives     Also increases liver function     Topiramate Rash and Swelling            Psychiatric Examination:   /83 (BP Location: Left arm)   Pulse 72   Temp 98.3  F (36.8  C) (Oral)   Resp 16   Wt 103.9 kg (229 lb)   SpO2 100%   BMI 32.86 kg/m    Weight is 229 lbs 0 oz  Body mass index is 32.86 kg/m .    Appearance:  awake, alert and dressed in hospital scrubs  Attitude:  cooperative  Eye Contact:  good  Mood:  \"fine\"  Affect:  mood congruent, less constricted  Speech:  rapid speech, but interruptable  Psychomotor Behavior:  no " evidence of tardive dyskinesia, dystonia, or tics  Thought Process:  Generally linear, and goal oriented, some illogical statements made  Associations:  no loose associations  Thought Content:  no evidence of suicidal ideation or homicidal ideation and there is evidence of disorganization and paranoid thought process (believing Haldol Decanoate contains steroids)  Insight:  fair  Judgment:  fair  Oriented to:  time, person, and place  Attention Span and Concentration:  intact  Recent and Remote Memory:  intact  Language: speaks fluent English  Fund of Knowledge: appropriate  Muscle Strength and Tone: normal  Gait and Station: Normal         Labs:   No results found for this or any previous visit (from the past 24 hour(s)).       Assessment    Diagnostic Impression: Tosha Mcnair is a 39 year old female with a history of bipolar affective disorder, autism, unspecified anxiety, and mild intellectual disability who presented on health and  hold to outside ED with concern from [facility]  and [facility] crisis for paranoia, delusions, disorganized behavior, and inability to care for self in the context of likely medication nonadherence. Biological factors include medication nonadherance. Psychological factors include previous diagnoses of mood and neurodevelopmental disorders. Social factors include housing instability. The MSE on admission was notable for a malodorous, disheveled woman who endorses grandiose and paranoid delusions and demonstrates a disorganized, illogical thought process. The patient's presentation is most consistent at this time with psychosis NEC, rule out acute denisse with psychotic features.  It is also possible the patient's PTA phentermine may have contributed to her decompensation, however medication adherence prior to the hospitalization is questionable.    Hospital course: Tosha Mcnair was admitted to station 22 with  on a 72 hour hold. Her history  "indicated decline in function since expiration of commitment in February 2018.  Paperwork for commitment and [place] was filed 3/4 given concern for inability to care for self outside of hospital at this time.  PTA lamotrigine was held given evidence of poor adherence.  Declined treatment team recommendation for initiation of new mood stabilizer.  Risperidone 2 mg nightly initiated to target psychosis and denisse, however she declined this medication throughout the hospital stay. Her thought processes remained severely impaired with oppositional behavior and delusional confabulated rationales for her treatment refusal (claimed that other doctors had rendered opposite opinions that she would follow, treatment order was \"in violation of [place] Law # [id number]...\" etc.) Quetiapine was started to target symptoms of denisse and psychosis. She was later cross-titrated to Haldol to target her symptoms with improvement in thought process on a dose of 5 mg daily. She gained insight and said she had been \"out of sorts\" at the time of admission. Haloperidol COLE (decanoate) was recommended by the treatment team, which the patient declined. The plan is to discuss this option with the ACT team for their recommendation since she may need eyes-on meds if discharged only on po medications due to history of poor adherence in the community.    Discontinued Medications (& Rationale):  - Risperidone, as the patient declined to take this medication  - Quetiapine, favoring Haldol    Medical course Patient was cleared medically  by the ED physician for inpatient psychiatry admission. UTox negative. CBC, CMP, lipids, TSH, Vitamin D grossly wnl. Lamotrigine level undetectable on admission. IM was consulted for right ear pain.    Plan   Principal Diagnosis:   # Psychosis NEC, rule out acute denisse with psychotic features     Secondary psychiatric diagnoses of concern this admission:   # Bipolar affective disorder by history  # " Unspecified anxiety by history  # Autism by history  # Mild intellectual disability (IQ 50-70) by history    Psychotropic Medications:  Modify:    Continue:  - Haldol 5mg PO or IM at bedtime  - Olanzapine 10 mg PO/IM Q2H PRN for agitation  - Hydroxyzine 25-50 Q6H PRN for anxiety  - Melatonin 3 mg at bedtime for sleep    Patient will be treated in therapeutic milieu with appropriate individual and group therapies as described.      Medical diagnoses to be addressed this admission:    # Asthma  - Continue PTA albuterol prn      # Hyperlipidemia, by history  Low HDL of 40, otherwise unremarkable.      # Hepatitis C carrier, by history  On review of chart, HCV antibody negative in 2017 at [facility].      # GI/FEN  - Mylanta PRN for indigestion  - Milk of Magnesia PRN for constipation    Data: As above  Consults: Nutrition    Legal Status: [place]  Committed    Safety Assessment:   Behavioral Orders   Procedures     Code 1 - Restrict to Unit     Elopement precautions     Routine Programming     As clinically indicated     Self Injury Precaution     Single Room     Status 15     Every 15 minutes.     Suicide precautions     Patients on Suicide Precautions should have a Combination Diet ordered that includes a Diet selection(s) AND a Behavioral Tray selection for Safe Tray - with utensils, or Safe Tray - NO utensils         Disposition: Unknown at this time, pending clinical stabilization and formulation of an appropriate treatment plan    This documentation accurately reflects the services I personally performed and treatment decisions made by me in consultation with the attending physician Arden Stafford MD.    Wilmer Wellington MD  Psychiatry PGY-1  [phone]    Psychiatry Attending Attestation:  This patient has been seen and evaluated by me, Arden Stafford M.D.  The patient's condition and treatment plan were discussed with the resident, and care coordinated with the CTC and RN. I reviewed, edited and agree with  the findings and plan in this note.     Arden Stafford M.D.   of Psychiatry

## 2019-03-21 NOTE — PROGRESS NOTES
Pt was isolative and withdrawn to her room, ate meals in her room. Pt was pleasant on approach, calm, cooperative. Pt reports ear pain and stated internal med told her she had an external ear infection. Pt reports her medication is helping her and that she views it as beneficial. Pt denies, SI/SIB, hallucinations, and medication side affects.      03/21/19 1429   Behavioral Health   Hallucinations denies / not responding to hallucinations   Thinking distractable   Orientation person: oriented;place: oriented;date: oriented;time: oriented   Memory baseline memory   Insight poor   Judgement impaired   Eye Contact at examiner   Affect blunted, flat   Mood mood is calm   Physical Appearance/Attire attire appropriate to age and situation   Hygiene well groomed   Suicidality other (see comments)  (denies)   1. Wish to be Dead No   2. Non-Specific Active Suicidal Thoughts  No   Self Injury other (see comment)  (denies)   Elopement (No current concerns )   Activity withdrawn;isolative   Speech coherent;clear   Medication Sensitivity no stated side effects;no observed side effects   Psychomotor / Gait balanced;steady   Psycho Education   Type of Intervention 1:1 intervention   Response participates, initiates socially appropriate   Hours 0.5   Activities of Daily Living   Hygiene/Grooming independent   Oral Hygiene independent   Dress independent   Laundry with supervision   Room Organization independent   Activity   Activity Assistance Provided independent

## 2019-03-21 NOTE — CONSULTS
Internal Medicine Initial Visit    Tosha Mcnair MRN# [id number]   Age: 39 year old YOB: 1980   Date of Admission: 3/1/2019     Reason for consult: Ear pain        Requesting physician Dr. Wilmer Wellington of psychiatry       SUBJECTIVE   HPI:   Tosha Mcnair is a 39 year old woman with history of bipolar disorder, asthma, anxiety disorder, intellectual disability (IQ 50-70), autism admitted to [address] inpatient mental [facility] for disorganized behaviors, paranoia, delusional thoughts content in the context of likely medication non adherence. Medicine was asked to see patient due to ear pain.      Patient reports developing right ear pain starting 2 days ago. Notes foal smelling, yellow discharge from ear. Patient feels pain is worsening. She denies any history of trauma to ear. She has not tried any intervention for ear pain at this time. She notes she had chills with no sweats or fevers. No associated soar throat, nausea, vomiting, sinus congestion.     Denies fevers, chest pain, SOB, nausea, vomiting, abdominal pain.      Past Medical History:     Past Medical History:   Diagnosis Date     Adjustment reaction      Anxiety disorder      Asthma      Bipolar disorder (H)      JYOTI I (cervical intraepithelial neoplasia I)      Mild mental retardation (I.Q. 50-70)      Overweight (BMI 25.0-29.9)       Reviewed & updated in [employer].     Past Surgical History:      Past Surgical History:   Procedure Laterality Date     APPENDECTOMY  06/2000     CHOLECYSTECTOMY  06/2000     HYSTERECTOMY VAGINAL  7/17/12    ovaries remain      MANDIBLE SURGERY  05/2015      Reviewed & updated in Epic.     Medications prior to admission:     Prior to Admission Medications   Prescriptions Last Dose Informant Patient Reported? Taking?   LAMICTAL  MG ER tablet Unknown at Unknown time  Yes No   Sig: Take 1 tablet (100 mg) by mouth daily   albuterol (PROAIR HFA, PROVENTIL HFA, VENTOLIN HFA) 108 (90 BASE) MCG/ACT inhaler  Unknown at Unknown time  No No   Sig: Inhale 2 puffs into the lungs every 6 hours as needed for shortness of breath / dyspnea or wheezing   fluticasone (FLOVENT HFA) 110 MCG/ACT inhaler Unknown at Unknown time  No No   Sig: Inhale 2 puffs into the lungs 2 times daily   montelukast (SINGULAIR) 10 MG tablet Unknown at Unknown time  No No   Sig: Take 1 tablet (10 mg) by mouth At Bedtime   multivitamin, therapeutic with minerals (MULTI-VITAMIN) TABS Unknown at Unknown time  Yes No   Sig: Take 1 tablet by mouth daily   phentermine 37.5 MG capsule Unknown at Unknown time  Yes No   Si/2 tab BID      Facility-Administered Medications: None         Allergies:     Allergies   Allergen Reactions     Codeine Anaphylaxis     Morphine Anaphylaxis     Cardiac arrest     Darvocet [Propoxyphene N-Apap] Other (See Comments)     Affects kidneys     Divalproex Sodium-Fd&C Red #40 Hives and Swelling     Lithium Hives and Swelling     Penicillins Hives     Phenytoin Hives     Sulfa Drugs Hives     Also increases liver function     Topiramate Rash and Swelling         Social History:     Social History     Socioeconomic History     Marital status:      Spouse name: Not on file     Number of children: 3     Years of education: Not on file     Highest education level: Not on file   Occupational History     Occupation: disability   Social Needs     Financial resource strain: Not on file     Food insecurity:     Worry: Not on file     Inability: Not on file     Transportation needs:     Medical: Not on file     Non-medical: Not on file   Tobacco Use     Smoking status: Never Smoker     Smokeless tobacco: Never Used   Substance and Sexual Activity     Alcohol use: Yes     Alcohol/week: 1.8 oz     Types: 3 Glasses of wine per week     Drug use: No     Sexual activity: Yes     Partners: Male   Lifestyle     Physical activity:     Days per week: Not on file     Minutes per session: Not on file     Stress: Not on file   Relationships      Social connections:     Talks on phone: Not on file     Gets together: Not on file     Attends Rastafarian service: Not on file     Active member of club or organization: Not on file     Attends meetings of clubs or organizations: Not on file     Relationship status: Not on file     Intimate partner violence:     Fear of current or ex partner: Not on file     Emotionally abused: Not on file     Physically abused: Not on file     Forced sexual activity: Not on file   Other Topics Concern     Not on file   Social History Narrative     Not on file        Family History:     Family History   Problem Relation Age of Onset     Hypertension Father      Diabetes Father      Gastrointestinal Disease Sister         crohns disease        Reviewed & updated in Epic.     Review of Systems:   Ten point review of systems negative except as stated above in History of Present Illness.    OBJECTIVE   Physical Exam:   Blood pressure 118/75, pulse 79, temperature 98  F (36.7  C), temperature source Oral, resp. rate 16, weight 102.7 kg (226 lb 8 oz), SpO2 96 %, not currently breastfeeding.  General: Alert, awake, and in NAD. Non-toxic appearing.  HEENT: NC/AT. Anicteric sclera. Eyes symmetric and free of discharge bilaterally. Mucous membranes moist. LEFT EAR: external auditory canals and tympanic membranes without erythema or edema. RIGHT EAR: erythema and edema of auditory canal, TM visualized and transparent, no air fluid level appreciated. Mild tenderness of anterior ear with palpation.   Neck: Supple  Cardiovascular: RRR. S1,S2. No murmurs appreciated.  Lungs: Normal respiratory effort on RA. Lungs CTAB without wheezes, rales, or rhonchi.  Abdomen: Soft, non-tender, and nondistended with normoactive bowel sounds.  Extremities: Warm & well perfused. No peripheral edema.  Skin: No rashes, jaundice, or lesions on exposed areas of skin.  Neurologic: A&O X 3. Answers questions appropriately. Moves all extremities symmetrically.      Laboratory Data:   CMPNo lab results found in last 7 days.    CBCNo lab results found in last 7 days.    TSHNo lab results found in last 7 days.       Assessment and Plan/Recommendations:   Tosha Mcnair is a 39 year old woman with history of bipolar disorder, asthma, anxiety disorder, intellectual disability (IQ 50-70), autism admitted to [address] mental health for disorganized behaviors, paranoia, delusional thoughts content in the context of likely medication non adherence. Medicine was asked to see patient due to ear pain.     #Bipolar affective disorder   #Anxiety  #Autism   #intellectual disability   - Management per psychiatry     #Otitis externa, right ear   - Irrigate right ear with warm water prior to initiation of antibiotic ear drops   - Ciprodex ear drops 4 drops right ear BID X 7 days   - Tylenol PRN for pain   - Follow up with PCP within on week of completion of abx to evaluate for resolution   - If patient remains inpatient following completion of abx please notify medicine to re-evaluate right ear for resolution     #Asthma, mild persistent PTA on albuterol PRN. Previously on montelukast and fluticasone however these were discontinued in 2018. No evidence of exacerbation at this time.   - Continue albuterol PRN     Medicine will sign off. Please page the Internal Medicine job code pager for any intercurrent medical issues which arise. Thank you for the opportunity to be a part of this patient's care.    Agnes Dubose PA-C  Hospitalist Service  [phone]

## 2019-03-22 PROCEDURE — 25000132 ZZH RX MED GY IP 250 OP 250 PS 637: Performed by: STUDENT IN AN ORGANIZED HEALTH CARE EDUCATION/TRAINING PROGRAM

## 2019-03-22 PROCEDURE — G0177 OPPS/PHP; TRAIN & EDUC SERV: HCPCS

## 2019-03-22 PROCEDURE — 99232 SBSQ HOSP IP/OBS MODERATE 35: CPT | Mod: GC | Performed by: PSYCHIATRY & NEUROLOGY

## 2019-03-22 PROCEDURE — 12400001 ZZH R&B MH UMMC

## 2019-03-22 RX ADMIN — HALOPERIDOL 5 MG: 5 TABLET ORAL at 20:09

## 2019-03-22 RX ADMIN — CIPROFLOXACIN AND DEXAMETHASONE 4 DROP: 3; 1 SUSPENSION/ DROPS AURICULAR (OTIC) at 20:08

## 2019-03-22 RX ADMIN — CIPROFLOXACIN AND DEXAMETHASONE 4 DROP: 3; 1 SUSPENSION/ DROPS AURICULAR (OTIC) at 08:35

## 2019-03-22 ASSESSMENT — ACTIVITIES OF DAILY LIVING (ADL)
DRESS: STREET CLOTHES;SCRUBS (BEHAVIORAL HEALTH);INDEPENDENT
HYGIENE/GROOMING: HANDWASHING;SHOWER;INDEPENDENT
HYGIENE/GROOMING: INDEPENDENT
LAUNDRY: WITH SUPERVISION
ORAL_HYGIENE: INDEPENDENT
ORAL_HYGIENE: INDEPENDENT
DRESS: STREET CLOTHES;SCRUBS (BEHAVIORAL HEALTH)

## 2019-03-22 NOTE — PROGRESS NOTES
03/21/19 4350   Significant Event   Significant Event Other (see comments)  (shift summary)     Pt was briefly in the lounge this evening, withdrawn from peers when present in the milieu.  Calm, cooperative and quiet.  No stated SI, SIB or hallucination.  Flat/blunted affect and independent with ADLS..

## 2019-03-22 NOTE — PROGRESS NOTES
SPIRITUAL HEALTH SERVICES  SPIRITUAL ASSESSMENT Progress Note  [facility] Station 22    REFERRAL SOURCE: I visited this morning patient Tosha per duration of stay in the unit. I did encouraged patient Tosha to come and join my spirituality group discussion. Pt came to attend the group and she was active in the group discussion. At the end of the group, the pt asked me to pray for her and I did. Pt asked me to focus on my prayer about Peace and Humility. I did pray the way she wanted me to focus on my prayer and at the end my prayer pt appreciated about my spiritual support and the offering prayer.     PLAN: I will remain open to provide spiritual care for the pt as needed.    Sunshine Kraft M.Div ([place])., M.Th., D.Min., [facility]  Staff   Pager [phone]

## 2019-03-22 NOTE — PROGRESS NOTES
Behavioral Health  Note   Behavioral Health  Spirituality Group Note     Unit 22    Name: Tosha Mcnair    YOB: 1980   MRN: [id number]    Age: 39 year old     Patient attended -led group, which included discussion of spirituality, coping with illness and building resilience.   Patient attended group for 1.0 hrs.   The patient actively participated in group discussion     Sarahmsgeovany [facility]  Staff    Page [phone]

## 2019-03-22 NOTE — PROGRESS NOTES
Pt denies SI/SIB/hallucinations/anxiety/depression. Pt was short with responses. Stated she is waiting for discharge but is unsure where she is going.  Pt attended 1 group this morning. Pt was in her room the rest of the day, including eating lunch in her room.  Pt completed hygiene cares. Denies adverse effects from medications. Encouraged pt to verbalize feelings and will continue with plan of care.

## 2019-03-23 PROCEDURE — 25000132 ZZH RX MED GY IP 250 OP 250 PS 637: Performed by: STUDENT IN AN ORGANIZED HEALTH CARE EDUCATION/TRAINING PROGRAM

## 2019-03-23 PROCEDURE — 12400001 ZZH R&B MH UMMC

## 2019-03-23 RX ADMIN — CIPROFLOXACIN AND DEXAMETHASONE 4 DROP: 3; 1 SUSPENSION/ DROPS AURICULAR (OTIC) at 20:45

## 2019-03-23 RX ADMIN — HALOPERIDOL 5 MG: 5 TABLET ORAL at 20:44

## 2019-03-23 RX ADMIN — CIPROFLOXACIN AND DEXAMETHASONE 4 DROP: 3; 1 SUSPENSION/ DROPS AURICULAR (OTIC) at 08:54

## 2019-03-23 ASSESSMENT — ACTIVITIES OF DAILY LIVING (ADL)
HYGIENE/GROOMING: INDEPENDENT
LAUNDRY: WITH SUPERVISION
DRESS: INDEPENDENT
HYGIENE/GROOMING: INDEPENDENT
DRESS: INDEPENDENT
ORAL_HYGIENE: INDEPENDENT
ORAL_HYGIENE: INDEPENDENT
LAUNDRY: WITH SUPERVISION

## 2019-03-23 NOTE — PLAN OF CARE
Tosha agreed to talk with this nurse privately. She denies A/V hallucinations, denies SI/HI. She has full range of affect and is well groomed. Still talks rapidly. She says that she believes the medication is helping her and that she is thinking much more clearly. She said she is happy to have a new  named Bryanna and hopes that they will get along better than the old CM. She plans to return to the same living situation, she said. She has been medication compliant.

## 2019-03-24 PROCEDURE — 12400001 ZZH R&B MH UMMC

## 2019-03-24 PROCEDURE — 25000132 ZZH RX MED GY IP 250 OP 250 PS 637: Performed by: STUDENT IN AN ORGANIZED HEALTH CARE EDUCATION/TRAINING PROGRAM

## 2019-03-24 RX ADMIN — CIPROFLOXACIN AND DEXAMETHASONE 4 DROP: 3; 1 SUSPENSION/ DROPS AURICULAR (OTIC) at 09:08

## 2019-03-24 RX ADMIN — CIPROFLOXACIN AND DEXAMETHASONE 4 DROP: 3; 1 SUSPENSION/ DROPS AURICULAR (OTIC) at 21:01

## 2019-03-24 RX ADMIN — HALOPERIDOL 5 MG: 5 TABLET ORAL at 21:01

## 2019-03-24 ASSESSMENT — ACTIVITIES OF DAILY LIVING (ADL)
ORAL_HYGIENE: INDEPENDENT
HYGIENE/GROOMING: HANDWASHING;SHOWER;INDEPENDENT
DRESS: INDEPENDENT
LAUNDRY: WITH SUPERVISION
HYGIENE/GROOMING: INDEPENDENT
ORAL_HYGIENE: INDEPENDENT

## 2019-03-24 NOTE — PLAN OF CARE
"Tosha comes up in timely manner for her ear drops.  She said, \"Don't forget the cotton.\", which was prescient because reminder needed.  Stated that her left ear is less plugged.  She had a small smile, did not have direct eye contact for the most part and even when smile present facial features not emotive. Polite, \"thank you.\", and observant when other patients have requests for staff that she is talking with and will hurry herself up to allow others to be accommodated.    "

## 2019-03-25 PROCEDURE — 12400001 ZZH R&B MH UMMC

## 2019-03-25 PROCEDURE — 99232 SBSQ HOSP IP/OBS MODERATE 35: CPT | Mod: GC | Performed by: PSYCHIATRY & NEUROLOGY

## 2019-03-25 PROCEDURE — 25000132 ZZH RX MED GY IP 250 OP 250 PS 637: Performed by: STUDENT IN AN ORGANIZED HEALTH CARE EDUCATION/TRAINING PROGRAM

## 2019-03-25 RX ADMIN — CIPROFLOXACIN AND DEXAMETHASONE 4 DROP: 3; 1 SUSPENSION/ DROPS AURICULAR (OTIC) at 21:00

## 2019-03-25 RX ADMIN — ACETAMINOPHEN 325 MG: 325 TABLET, FILM COATED ORAL at 17:50

## 2019-03-25 RX ADMIN — HALOPERIDOL 5 MG: 5 TABLET ORAL at 21:00

## 2019-03-25 RX ADMIN — CIPROFLOXACIN AND DEXAMETHASONE 4 DROP: 3; 1 SUSPENSION/ DROPS AURICULAR (OTIC) at 09:55

## 2019-03-25 ASSESSMENT — ACTIVITIES OF DAILY LIVING (ADL)
HYGIENE/GROOMING: INDEPENDENT
LAUNDRY: UNABLE TO COMPLETE
DRESS: INDEPENDENT
HYGIENE/GROOMING: INDEPENDENT
ORAL_HYGIENE: INDEPENDENT
DRESS: SCRUBS (BEHAVIORAL HEALTH)
ORAL_HYGIENE: INDEPENDENT

## 2019-03-25 NOTE — PROGRESS NOTES
CLINICAL NUTRITION SERVICES - REASSESSMENT NOTE     Nutrition Prescription    RECOMMENDATIONS FOR MDs/PROVIDERS TO ORDER:  None today    Malnutrition Status:    Patient does not meet two of the criteria necessary for diagnosing malnutrition     Recommendations already ordered by Registered Dietitian (RD):  Modify composition of meals/snacks - discontinue 2pm snack, add rice noodles at lunch, add berry yogurt and applesauce with breakfast tray    Future/Additional Recommendations:  Monitor po intakes and wt trends.   Adjust/discontinue snacks as needed     EVALUATION OF THE PROGRESS TOWARD GOALS   Diet: Regular  Snacks/Supplements: 2pm applesauce and roast beef sandwich  Intake: Pt noted to be eating well at meals. Pt reports eating ~50% of meals TID + some snacks. Requesting current snack order be changed. Suspect pt meeting >80% assessed needs given weight trends.      NEW FINDINGS   Wt: Wt stable 225-229 lb over admission    MALNUTRITION  % Intake: No decreased intake noted  % Weight Loss: None noted  Subcutaneous Fat Loss: None observed  Muscle Loss: None observed  Fluid Accumulation/Edema: None noted  Malnutrition Diagnosis: Patient does not meet two of the above criteria necessary for diagnosing malnutrition    Previous Goals   Patient to consume % of nutritionally adequate meal trays TID, or the equivalent with supplements/snacks.  Evaluation: Met    Previous Nutrition Diagnosis  Predicted inadequate nutrient intake (protein-energy) related to variable appetite as evidenced by reliance on po intakes to meet estimated needs with potential for decline.     Evaluation: Resolved    CURRENT NUTRITION DIAGNOSIS  No nutrition diagnosis at this time    INTERVENTIONS  Implementation  Modify composition of meals/snacks - discontinue 2pm snack, add rice noodles at lunch, add berry yogurt and applesauce with breakfast tray  Pt asking about receiving chocolate milk with meals. Explained that pt can choose beverages  on the set menu. Boost Plus may be what she is referring to, which is reserved pt with indicated nutritional need for supplement. Pt understanding.     Goals  Patient to consume % of nutritionally adequate meal trays TID, or the equivalent with supplements/snacks.    Monitoring/Evaluation  Progress toward goals will be monitored and evaluated per protocol.    Victorina Lynch RD, LD  Unit pgr: [phone]

## 2019-03-25 NOTE — PLAN OF CARE
Tosha participated in community meeting. Said that she would like to bunje jump again one day. She looks forward to a call from her  early in the morning. She would like to be awakened at 7:00. She also looks forward to meeting with her  tomorrow. She is hopeful for discharge soon. Denies A/V hallucinations. Denies SI/SIB. Medication compliant.

## 2019-03-25 NOTE — PROGRESS NOTES
Pt spent a lot of the shift in her room. She was isolative and withdrawn from peers and staff. Denied HI/SI/SIB and hallucinations. She ate meals. No visitors or shower. No restraints, seclusion, or redirection needed to manage behaviors. No concerns for staff.       03/25/19 1400   Behavioral Health   Hallucinations denies / not responding to hallucinations   Thinking poor concentration   Orientation person: oriented;place: oriented   Memory baseline memory   Insight poor   Judgement impaired   Eye Contact at examiner   Affect blunted, flat   Mood mood is calm   Physical Appearance/Attire attire appropriate to age and situation   Hygiene well groomed   Suicidality other (see comments)  (Denies)   1. Wish to be Dead No   2. Non-Specific Active Suicidal Thoughts  No   Change in Protective Factors? No   Enviromental Risk Factors None   Self Injury other (see comment)  (Denies)   Activity isolative;withdrawn   Speech clear;coherent   Medication Sensitivity no stated side effects;no observed side effects   Psychomotor / Gait balanced;steady   Activities of Daily Living   Hygiene/Grooming independent   Oral Hygiene independent   Dress scrubs (behavioral health)   Laundry unable to complete   Room Organization independent

## 2019-03-25 NOTE — PROGRESS NOTES
----------------------------------------------------------------------------------------------------------  [facility], [place]   Psychiatric Progress Note  Hospital Day #24     Interim History:   The patient's care was discussed with the treatment team and chart notes were reviewed.    Sleep: 6.75h  Scheduled Medications: Taken as prescribed  PRNs: None  Staff Report: No acute events over the weekend. Participated with spiritual health.    Patient Interview: Patient was interviewed in the patient's room on Station 22. Tosha stated that she talked with her mother over the weekend. Tosha stated that she could live with her parents for a period of time if needed upon discharge. Tosha talked with Bryanna, who is helping her find housing, and stated that she would attempt to renew her lease at her current apartment. She reported no medication side effects. We discussed her future meeting with an ACT team on 3/27. She had no additional concerns or questions at this time.    The risks, benefits, alternatives and side effects of any medication changes have been discussed and are understood by the patient and other caregivers.    Review of systems:     ROS was negative unless noted above.          Allergies:     Allergies   Allergen Reactions     Codeine Anaphylaxis     Morphine Anaphylaxis     Cardiac arrest     Darvocet [Propoxyphene N-Apap] Other (See Comments)     Affects kidneys     Divalproex Sodium-Fd&C Red #40 Hives and Swelling     Lithium Hives and Swelling     Penicillins Hives     Phenytoin Hives     Sulfa Drugs Hives     Also increases liver function     Topiramate Rash and Swelling            Psychiatric Examination:   /71   Pulse 66   Temp 98.2  F (36.8  C) (Oral)   Resp 16   Wt 103 kg (227 lb)   SpO2 98%   BMI 32.57 kg/m    Weight is 227 lbs 0 oz  Body mass index is 32.57 kg/m .    Appearance:  awake, alert and dressed in hospital scrubs  Attitude:   "cooperative  Eye Contact:  good  Mood:  \"good\"  Affect:  mood congruent, less constricted  Speech:  clear, coherent  Psychomotor Behavior:  no evidence of tardive dyskinesia, dystonia, or tics  Thought Process:  Generally linear, and goal oriented, some illogical statements made  Associations:  no loose associations  Thought Content:  no evidence of suicidal ideation or homicidal ideation and no overt symptoms of psychosis  Insight:  fair  Judgment:  fair  Oriented to:  time, person, and place  Attention Span and Concentration:  intact  Recent and Remote Memory:  intact  Language: speaks fluent English  Fund of Knowledge: appropriate  Muscle Strength and Tone: normal  Gait and Station: Normal         Labs:   No results found for this or any previous visit (from the past 24 hour(s)).       Assessment    Diagnostic Impression: Tosha Mcnair is a 39 year old female with a history of bipolar affective disorder, autism, unspecified anxiety, and mild intellectual disability who presented on health and  hold to outside ED with concern from [facility]  and [facility] crisis for paranoia, delusions, disorganized behavior, and inability to care for self in the context of likely medication nonadherence. Biological factors include medication nonadherance. Psychological factors include previous diagnoses of mood and neurodevelopmental disorders. Social factors include housing instability. The MSE on admission was notable for a malodorous, disheveled woman who endorses grandiose and paranoid delusions and demonstrates a disorganized, illogical thought process. The patient's presentation is most consistent at this time with psychosis NEC, rule out acute denisse with psychotic features.  It is also possible the patient's PTA phentermine may have contributed to her decompensation, however medication adherence prior to the hospitalization is questionable.    Hospital course: Tosha Mcnair was admitted " "to station 22 with  on a 72 hour hold. Her history indicated decline in function since expiration of commitment in February 2018.  Paperwork for commitment and [place] was filed 3/4 given concern for inability to care for self outside of hospital at this time.  PTA lamotrigine was held given evidence of poor adherence.  Declined treatment team recommendation for initiation of new mood stabilizer.  Risperidone 2 mg nightly initiated to target psychosis and denisse, however she declined this medication throughout the hospital stay. Her thought processes remained severely impaired with oppositional behavior and delusional confabulated rationales for her treatment refusal (claimed that other doctors had rendered opposite opinions that she would follow, treatment order was \"in violation of [place] Law # [id number]...\" etc.) Quetiapine was started to target symptoms of denisse and psychosis. She was later cross-titrated to Haldol to target her symptoms with improvement in thought process on a dose of 5 mg daily. She gained insight and said she had been \"out of sorts\" at the time of admission. Haloperidol COLE (decanoate) was recommended by the treatment team, which the patient declined. The plan is to discuss this option with the ACT team for their recommendation since she may need eyes-on meds if discharged only on po medications due to history of poor adherence in the community.    Discontinued Medications (& Rationale):  - Risperidone, as the patient declined to take this medication  - Quetiapine, favoring Haldol    Medical course Patient was cleared medically  by the ED physician for inpatient psychiatry admission. UTox negative. CBC, CMP, lipids, TSH, Vitamin D grossly wnl. Lamotrigine level undetectable on admission. IM was consulted for right ear pain, and she was started on ciprodex drops.    Plan   Principal Diagnosis:   # Psychosis NEC, rule out acute denisse with psychotic features     Secondary " psychiatric diagnoses of concern this admission:   # Bipolar affective disorder by history  # Unspecified anxiety by history  # Autism by history  # Mild intellectual disability (IQ 50-70) by history    Psychotropic Medications:  Modify:    Continue:  - Haldol 5mg PO or IM at bedtime  - Olanzapine 10 mg PO/IM Q2H PRN for agitation  - Hydroxyzine 25-50 Q6H PRN for anxiety  - Melatonin 3 mg at bedtime for sleep    Patient will be treated in therapeutic milieu with appropriate individual and group therapies as described.      Medical diagnoses to be addressed this admission:    # Otitis externa  - Ciprodex ear drops BID for 7 days  - Follow-up with PCP after discharge or contact IM following completion of antibiotic drop course    # Asthma  - Continue PTA albuterol prn      # Hyperlipidemia, by history  Low HDL of 40, otherwise unremarkable.      # Hepatitis C carrier, by history  On review of chart, HCV antibody negative in 2017 at [facility].      # GI/FEN  - Mylanta PRN for indigestion  - Milk of Magnesia PRN for constipation    Data: As above  Consults: Nutrition    Legal Status: [place]  Committed    Safety Assessment:   Behavioral Orders   Procedures     Code 1 - Restrict to Unit     Elopement precautions     Routine Programming     As clinically indicated     Self Injury Precaution     Single Room     Status 15     Every 15 minutes.     Suicide precautions     Patients on Suicide Precautions should have a Combination Diet ordered that includes a Diet selection(s) AND a Behavioral Tray selection for Safe Tray - with utensils, or Safe Tray - NO utensils         Disposition: Unknown at this time, pending clinical stabilization and formulation of an appropriate treatment plan    This documentation accurately reflects the services I personally performed and treatment decisions made by me in consultation with the attending physician Arden Stafford MD.    Wilmer Wellington MD  Psychiatry  PGY-1  [phone]      Psychiatry Attending Attestation:  This patient has been seen and evaluated by me, Arden Stafford M.D.  The patient's condition and treatment plan were discussed with the resident, and care coordinated with the CTC and RN. I reviewed, edited and agree with the findings and plan in this note.     Arden Stafford M.D.   of Psychiatry

## 2019-03-26 PROCEDURE — 99232 SBSQ HOSP IP/OBS MODERATE 35: CPT | Mod: GC | Performed by: PSYCHIATRY & NEUROLOGY

## 2019-03-26 PROCEDURE — 25000132 ZZH RX MED GY IP 250 OP 250 PS 637: Performed by: STUDENT IN AN ORGANIZED HEALTH CARE EDUCATION/TRAINING PROGRAM

## 2019-03-26 PROCEDURE — 25000128 H RX IP 250 OP 636: Performed by: STUDENT IN AN ORGANIZED HEALTH CARE EDUCATION/TRAINING PROGRAM

## 2019-03-26 PROCEDURE — 12400001 ZZH R&B MH UMMC

## 2019-03-26 RX ORDER — HALOPERIDOL DECANOATE 100 MG/ML
100 INJECTION INTRAMUSCULAR
Status: COMPLETED | OUTPATIENT
Start: 2019-03-26 | End: 2019-03-26

## 2019-03-26 RX ORDER — HALOPERIDOL 5 MG/1
5 TABLET ORAL AT BEDTIME
Status: COMPLETED | OUTPATIENT
Start: 2019-03-26 | End: 2019-03-26

## 2019-03-26 RX ADMIN — CIPROFLOXACIN AND DEXAMETHASONE 4 DROP: 3; 1 SUSPENSION/ DROPS AURICULAR (OTIC) at 20:27

## 2019-03-26 RX ADMIN — HALOPERIDOL 5 MG: 5 TABLET ORAL at 20:57

## 2019-03-26 RX ADMIN — HALOPERIDOL DECANOATE 100 MG: 100 INJECTION INTRAMUSCULAR at 18:46

## 2019-03-26 RX ADMIN — CIPROFLOXACIN AND DEXAMETHASONE 4 DROP: 3; 1 SUSPENSION/ DROPS AURICULAR (OTIC) at 09:41

## 2019-03-26 ASSESSMENT — ACTIVITIES OF DAILY LIVING (ADL)
DRESS: INDEPENDENT
ORAL_HYGIENE: INDEPENDENT
HYGIENE/GROOMING: INDEPENDENT
ORAL_HYGIENE: INDEPENDENT
DRESS: INDEPENDENT
LAUNDRY: WITH SUPERVISION
HYGIENE/GROOMING: INDEPENDENT

## 2019-03-26 NOTE — PROGRESS NOTES
"    ----------------------------------------------------------------------------------------------------------  [facility], [place]   Psychiatric Progress Note  Hospital Day #25     Interim History:   The patient's care was discussed with the treatment team and chart notes were reviewed.    Sleep: 7h  Scheduled Medications: Taken as prescribed  PRNs: APAP x1  Staff Report: No acute events overnight.     Patient Interview: Patient was interviewed in the patient's room on Station 22. Tosha reports no medication side effects. She did not speak with her family members last night. We discussed the possibility of starting Haldol Decanoate and discontinuing her oral Haldol. The risks and benefits of starting a long acting formulation of Haldol. She was agreeable to starting this medication. We discussed her housing situation, and her ACT team interview tomorrow. She had no additional questions or concerns at this time.    The risks, benefits, alternatives and side effects of any medication changes have been discussed and are understood by the patient and other caregivers.    Review of systems:     ROS was negative unless noted above.          Allergies:     Allergies   Allergen Reactions     Codeine Anaphylaxis     Morphine Anaphylaxis     Cardiac arrest     Darvocet [Propoxyphene N-Apap] Other (See Comments)     Affects kidneys     Divalproex Sodium-Fd&C Red #40 Hives and Swelling     Lithium Hives and Swelling     Penicillins Hives     Phenytoin Hives     Sulfa Drugs Hives     Also increases liver function     Topiramate Rash and Swelling            Psychiatric Examination:   BP 99/63 (BP Location: Left arm)   Pulse 66   Temp 98.4  F (36.9  C) (Oral)   Resp 16   Wt 103 kg (227 lb)   SpO2 100%   BMI 32.57 kg/m    Weight is 227 lbs 0 oz  Body mass index is 32.57 kg/m .    Appearance:  awake, alert and dressed in hospital scrubs  Attitude:  cooperative  Eye Contact:  good  Mood:  \"doing " "well\"  Affect:  mood congruent, less constricted  Speech:  clear, coherent  Psychomotor Behavior:  No evidence of dystonia or tics, some evidence of possible TD (lip smacking, may be secondary to not having lower dentures)  Thought Process:  Generally linear, and goal oriented, some illogical statements made  Associations:  no loose associations  Thought Content:  no evidence of suicidal ideation or homicidal ideation and no overt symptoms of psychosis  Insight:  fair  Judgment:  fair  Oriented to:  time, person, and place  Attention Span and Concentration:  intact  Recent and Remote Memory:  intact  Language: speaks fluent English  Fund of Knowledge: appropriate  Muscle Strength and Tone: normal  Gait and Station: Normal         Labs:   No results found for this or any previous visit (from the past 24 hour(s)).       Assessment    Diagnostic Impression: Tosha Mcnair is a 39 year old female with a history of bipolar affective disorder, autism, unspecified anxiety, and mild intellectual disability who presented on health and  hold to outside ED with concern from [facility]  and [facility] crisis for paranoia, delusions, disorganized behavior, and inability to care for self in the context of likely medication nonadherence. Biological factors include medication nonadherance. Psychological factors include previous diagnoses of mood and neurodevelopmental disorders. Social factors include housing instability. The MSE on admission was notable for a malodorous, disheveled woman who endorses grandiose and paranoid delusions and demonstrates a disorganized, illogical thought process. The patient's presentation is most consistent at this time with a primary thought disorder, likely schizophrenia. There is continued concern about primary mood component with psychotic features, given her historical diagnosis of bipolar I disorder.  It is also possible the patient's PTA phentermine may have " "contributed to her decompensation, however medication adherence prior to the hospitalization is questionable. More collateral information is needed to delineate between these diagnoses.    Hospital course: Tosha Mcnair was admitted to station 22 with  on a 72 hour hold. Her history indicated decline in function since expiration of commitment in February 2018.  Paperwork for commitment and [place] was filed 3/4 given concern for inability to care for self outside of hospital at this time.  PTA lamotrigine was held given evidence of poor adherence.  Declined treatment team recommendation for initiation of new mood stabilizer.  Risperidone was initiated to target psychosis and denisse, however she declined this medication throughout the hospital stay. Her thought processes remained severely impaired with oppositional behavior and delusional confabulated rationales for her treatment refusal (claimed that other doctors had rendered opposite opinions that she would follow, treatment order was \"in violation of [place] Law # [id number]...\" etc.) Quetiapine was started to target symptoms of denisse and psychosis. She was later cross-titrated to Haldol to target her symptoms with improvement in thought process on a dose of 5 mg daily. She gained insight and said she had been \"out of sorts\" at the time of admission. Haloperidol COLE (decanoate) was recommended by the treatment team, which the patient initially declined but later agreed to. She was started on a COLE loading schedule of Haldol Decanoate 100mg IM, then after 4 weeks 75mg IM, then 50 mg IM at the 3rd and ongoing injections.     Discontinued Medications (& Rationale):  - Risperidone, as the patient declined to take this medication  - Quetiapine, favoring Haldol    Medical course Patient was cleared medically  by the ED physician for inpatient psychiatry admission. UTox negative. CBC, CMP, lipids, TSH, Vitamin D grossly wnl. Lamotrigine level " undetectable on admission. IM was consulted for right ear pain, and she was started on ciprodex drops for otitis externa.    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      Medical diagnoses to be addressed this admission:    # Otitis externa  - Ciprodex ear drops BID for 7 days  - Follow-up with PCP after discharge or contact IM following completion of antibiotic drop course    # Asthma  - Continue PTA albuterol prn      # Hyperlipidemia, by history  Low HDL of 40, otherwise unremarkable.      # Hepatitis C carrier, by history  On review of chart, HCV antibody negative in 2017 at [facility].      # GI/FEN  - Mylanta PRN for indigestion  - Milk of Magnesia PRN for constipation    Data: As above  Consults: Nutrition    Legal Status: [place]  Committed    Safety Assessment:   Behavioral Orders   Procedures     Code 1 - Restrict to Unit     Elopement precautions     Routine Programming     As clinically indicated     Self Injury Precaution     Status 15     Every 15 minutes.     Suicide precautions     Patients on Suicide Precautions should have a Combination Diet ordered that includes a Diet selection(s) AND a Behavioral Tray selection for Safe Tray - with utensils,  or Safe Tray - NO utensils         Disposition: Unknown at this time, pending clinical stabilization and formulation of an appropriate treatment plan    This documentation accurately reflects the services I personally performed and treatment decisions made by me in consultation with the attending physician Arden Stafford MD.    Wilmer Wellington MD  Psychiatry PGY-1  [phone]  Psychiatry Attending Attestation:  This patient has been seen and evaluated by me, Arden Stafford M.D.  The patient's condition and treatment plan were discussed with the resident, and care coordinated with the CTC and RN. I reviewed, edited and agree with the findings and plan in this note.     Arden Stafford M.D.   of Psychiatry

## 2019-03-26 NOTE — PLAN OF CARE
Ike-Tosha today asking when her discharge will occur, stating an apartment has been arranged by person from housing authority-encouraged to discuss discharge plans with Dr Stafford-stated she has arranged for another doctor to be on the case and he would be releasing her-c/o feeling tired this afternoon and requested to be allowed to rest-waiting Haldol dec-agrees to receive post nap

## 2019-03-26 NOTE — PROGRESS NOTES
Pt began the evening taking a nap and was observed watching TV periodically throughout the evening. Pt was observed while in the lounge making sporadic hand movements while moving their lips but making no sound. Pt was pleasant upon approach throughout the evening and moved to a room with a roommate with ease.      03/25/19 3834   Behavioral Health   Hallucinations denies / not responding to hallucinations   Thinking poor concentration;distractable   Orientation person: oriented;place: oriented   Memory baseline memory   Insight poor   Judgement impaired   Eye Contact at examiner   Affect full range affect   Mood mood is calm   Physical Appearance/Attire attire appropriate to age and situation   Hygiene well groomed   Suicidality (Pt denies)   1. Wish to be Dead No   2. Non-Specific Active Suicidal Thoughts  No   Self Injury (Pt denies)   Activity withdrawn   Speech clear;coherent   Medication Sensitivity no stated side effects;no observed side effects   Psychomotor / Gait balanced;steady   Psycho Education   Type of Intervention 1:1 intervention   Response participates with encouragement   Hours 0.5   Treatment Detail Check-in   Activities of Daily Living   Hygiene/Grooming independent   Oral Hygiene independent   Dress independent   Room Organization independent

## 2019-03-27 PROCEDURE — 99232 SBSQ HOSP IP/OBS MODERATE 35: CPT | Mod: GC | Performed by: PSYCHIATRY & NEUROLOGY

## 2019-03-27 PROCEDURE — 25000132 ZZH RX MED GY IP 250 OP 250 PS 637: Performed by: PHYSICIAN ASSISTANT

## 2019-03-27 PROCEDURE — 12400001 ZZH R&B MH UMMC

## 2019-03-27 RX ADMIN — CIPROFLOXACIN AND DEXAMETHASONE 4 DROP: 3; 1 SUSPENSION/ DROPS AURICULAR (OTIC) at 10:53

## 2019-03-27 RX ADMIN — CIPROFLOXACIN AND DEXAMETHASONE 4 DROP: 3; 1 SUSPENSION/ DROPS AURICULAR (OTIC) at 21:28

## 2019-03-27 ASSESSMENT — ACTIVITIES OF DAILY LIVING (ADL)
ORAL_HYGIENE: INDEPENDENT
DRESS: INDEPENDENT
HYGIENE/GROOMING: INDEPENDENT
LAUNDRY: WITH SUPERVISION

## 2019-03-27 NOTE — PROGRESS NOTES
Pt was observed withdrawn to their room for much of the evening. Pt was minimally social with staff and peers throughout the evening. Pt denies hallucinations as well as anxiety and depression.      03/26/19 2149   Behavioral Health   Hallucinations denies / not responding to hallucinations   Thinking delusional   Orientation person: oriented;place: oriented   Memory baseline memory   Insight poor   Judgement impaired   Eye Contact at examiner   Affect full range affect   Mood mood is calm   Physical Appearance/Attire attire appropriate to age and situation   Hygiene well groomed   1. Wish to be Dead No   2. Non-Specific Active Suicidal Thoughts  No   Self Injury (None stated or observed)   Elopement Statements about wanting to leave   Activity withdrawn   Speech clear;coherent   Medication Sensitivity no stated side effects;no observed side effects   Psychomotor / Gait balanced;steady   Psycho Education   Type of Intervention 1:1 intervention   Response participates with encouragement   Hours 0.5   Treatment Detail Check-in   Activities of Daily Living   Hygiene/Grooming independent   Oral Hygiene independent   Dress independent   Room Organization independent

## 2019-03-27 NOTE — PROGRESS NOTES
----------------------------------------------------------------------------------------------------------  [facility], [place]   Psychiatric Progress Note  Hospital Day #26     Interim History:   The patient's care was discussed with the treatment team and chart notes were reviewed.    Sleep: 6.5h  Scheduled Medications: Taken as prescribed  PRNs: None  Staff Report: No acute events overnight.     Patient Interview: Patient was interviewed in the patient's room on Station 22. She reports no medication side effects. She states she is in communication with [employer]anna to help acquire housing. She tolerated the injection of Haldol Decanoate well yesterday evening. We discussed about the meeting she has today with her ACT team. She had no additional questions or concerns at this time.    The risks, benefits, alternatives and side effects of any medication changes have been discussed and are understood by the patient and other caregivers.    Review of systems:     ROS was negative unless noted above.          Allergies:     Allergies   Allergen Reactions     Codeine Anaphylaxis     Morphine Anaphylaxis     Cardiac arrest     Darvocet [Propoxyphene N-Apap] Other (See Comments)     Affects kidneys     Divalproex Sodium-Fd&C Red #40 Hives and Swelling     Lithium Hives and Swelling     Penicillins Hives     Phenytoin Hives     Sulfa Drugs Hives     Also increases liver function     Topiramate Rash and Swelling            Psychiatric Examination:   BP 92/57 (BP Location: Right arm)   Pulse 66   Temp 98.2  F (36.8  C) (Oral)   Resp 14   Wt 103.9 kg (229 lb)   SpO2 96%   BMI 32.86 kg/m    Weight is 229 lbs 0 oz  Body mass index is 32.86 kg/m .    Appearance:  awake, alert and dressed in hospital scrubs  Attitude:  cooperative  Eye Contact:  good  Mood:  fine  Affect:  mood congruent, less constricted  Speech:  clear, coherent  Psychomotor Behavior:  No evidence of dystonia or tics, some evidence  of possible TD (lip smacking, may be secondary to not having lower dentures)  Thought Process:  Generally linear, and goal oriented, some illogical statements made about housing status  Associations:  no loose associations  Thought Content:  no evidence of suicidal ideation or homicidal ideation and no overt symptoms of psychosis  Insight:  fair  Judgment:  fair  Oriented to:  time, person, and place  Attention Span and Concentration:  intact  Recent and Remote Memory:  intact  Language: speaks fluent English  Fund of Knowledge: appropriate  Muscle Strength and Tone: normal  Gait and Station: Normal         Labs:   No results found for this or any previous visit (from the past 24 hour(s)).       Assessment    Diagnostic Impression: Tosha Mcnair is a 39 year old female with a history of bipolar affective disorder, autism, unspecified anxiety, and mild intellectual disability who presented on health and  hold to outside ED with concern from [facility]  and [facility] crisis for paranoia, delusions, disorganized behavior, and inability to care for self in the context of likely medication nonadherence. Biological factors include medication nonadherance. Psychological factors include previous diagnoses of mood and neurodevelopmental disorders. Social factors include housing instability. The MSE on admission was notable for a malodorous, disheveled woman who endorses grandiose and paranoid delusions and demonstrates a disorganized, illogical thought process. The patient's presentation is most consistent at this time with a primary thought disorder, likely schizophrenia. There is continued concern about primary mood component with psychotic features, given her historical diagnosis of bipolar I disorder.  It is also possible the patient's PTA phentermine may have contributed to her decompensation, however medication adherence prior to the hospitalization is questionable. More collateral  "information is needed to delineate between these diagnoses.    Hospital course: Tosha Mcnair was admitted to station 22 with  on a 72 hour hold. Her history indicated decline in function since expiration of commitment in February 2018.  Paperwork for commitment and [place] was filed 3/4 given concern for inability to care for self outside of hospital at this time.  PTA lamotrigine was held given evidence of poor adherence.  Risperidone was initiated to target psychosis and denisse, however she declined this medication throughout the hospital stay. Her thought processes remained severely impaired with oppositional behavior and delusional confabulated rationales for her treatment refusal (claimed that other doctors had rendered opposite opinions that she would follow, treatment order was \"in violation of [place] Law # [id number]...\" etc.) Quetiapine was started to target symptoms of denisse and psychosis. She was later cross-titrated to Haldol to target her symptoms with improvement in thought process on a dose of 5 mg daily. She gained insight and said she had been \"out of sorts\" at the time of admission. Haloperidol COLE (decanoate) was recommended by the treatment team, which the patient initially declined but later agreed to. She was started on a COLE loading schedule of Haldol Decanoate 100mg IM, and after discharge should get the 2nd injection of 75 mg in later April, then the maintenance dose of 50 mg IM in late May and monthly thereafter.     Discontinued Medications (& Rationale):  - Risperidone, as the patient declined to take this medication  - Quetiapine, favoring Haldol    Medical course Patient was cleared medically  by the ED physician for inpatient psychiatry admission. UTox negative. CBC, CMP, lipids, TSH, Vitamin D grossly wnl. Lamotrigine level undetectable on admission. IM was consulted for right ear pain, and she was started on ciprodex drops for otitis externa.    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      Medical diagnoses to be addressed this admission:    # Otitis externa  - Ciprodex ear drops BID for 7 days  - Follow-up with PCP after discharge or contact IM following completion of antibiotic drop course    # Asthma  - Continue PTA albuterol prn      # Hyperlipidemia, by history  Low HDL of 40, otherwise unremarkable.      # Hepatitis C carrier, by history  On review of chart, HCV antibody negative in 2017 at [facility].      # GI/FEN  - Mylanta PRN for indigestion  - Milk of Magnesia PRN for constipation    Data: As above  Consults: Nutrition    Legal Status: [place]  Committed    Safety Assessment:   Behavioral Orders   Procedures     Code 1 - Restrict to Unit     Elopement precautions     Routine Programming     As clinically indicated     Self Injury Precaution     Status 15     Every 15 minutes.     Suicide precautions     Patients on Suicide Precautions should have a Combination Diet ordered that includes a Diet selection(s) AND a Behavioral Tray selection for Safe Tray - with utensils, or Safe Tray - NO utensils         Disposition: Unknown at this time, pending clinical stabilization and formulation of an appropriate treatment plan    This documentation accurately  reflects the services I personally performed and treatment decisions made by me in consultation with the attending physician Arden Stafford MD.    Wilmer Wellington MD  Psychiatry PGY-1  [phone]      Psychiatry Attending Attestation:Psychiatry Attending Attestation:  This patient has been seen and evaluated by me, Arden Stafford M.D.  The patient's condition and treatment plan were discussed with the resident, and care coordinated with the CTC and RN. I reviewed, edited and agree with the findings and plan in this note.     Arden Stafford M.D.   of Psychiatry

## 2019-03-28 PROCEDURE — 99232 SBSQ HOSP IP/OBS MODERATE 35: CPT | Mod: GC | Performed by: PSYCHIATRY & NEUROLOGY

## 2019-03-28 PROCEDURE — 99231 SBSQ HOSP IP/OBS SF/LOW 25: CPT | Performed by: PHYSICIAN ASSISTANT

## 2019-03-28 PROCEDURE — 12400001 ZZH R&B MH UMMC

## 2019-03-28 PROCEDURE — 99207 ZZC CONSULT E&M CHANGED TO SUBSEQUENT LEVEL: CPT | Performed by: PHYSICIAN ASSISTANT

## 2019-03-28 RX ADMIN — CIPROFLOXACIN AND DEXAMETHASONE 4 DROP: 3; 1 SUSPENSION/ DROPS AURICULAR (OTIC) at 09:54

## 2019-03-28 ASSESSMENT — ACTIVITIES OF DAILY LIVING (ADL)
ORAL_HYGIENE: INDEPENDENT
DRESS: INDEPENDENT
LAUNDRY: WITH SUPERVISION
HYGIENE/GROOMING: INDEPENDENT

## 2019-03-28 NOTE — CONSULTS
"Brief Medicine Consult Note    Contacted by Psychiatry regarding follow-up of otitis externa.     HPI:  Today's vital signs, medications, and nursing notes were reviewed.     Patient was evaluated by Medicine on 3/21 for c/o 2 days of right ear pain. Exam and history were most consistent with otitis externa. She was started on a 7-day course of Ciprodex ear drops QID, which she completed this morning.     Patient reports resolution of her right ear pain and is feeling \"much better.\" She denies any hearing difficulties, drainage, or itching of the right ear. No left ear complaints. No fever/chills, headaches, cold symptoms. She notes she uses Q-tips regularly at home to clean her ears.     /70   Pulse 68   Temp 98.1  F (36.7  C) (Oral)   Resp 12   Wt 103.9 kg (229 lb)   SpO2 98%   BMI 32.86 kg/m    General: Alert and oriented. In no apparent distress.   Right ear: External auditory canal is clear with no erythema, edema, or discharge. TM fully visualized, pearly gray with good light reflex, non-erythematous, no air fluid level. External ear is entirely non-tender to palpation.   Left ear: External auditory canal is clear with no erythema, edema, or discharge. TM partially obscured by cerumen but no visible air fluid level.   Resp: Breathing comfortably on room air.       A/P:  Right otitis externa, resolved: She has completed an adequate course of Ciprodex drops. Discussed Q-tip use, encouraged her to use other methods to clean her ears. No indications for further treatment or follow-up.       Medicine will sign-off. Please page if new concerns or questions arise.     Maida Delgadillo PA-C  Hospitalist Service  Pager: [phone]        "

## 2019-03-28 NOTE — PLAN OF CARE
BEHAVIORAL TEAM DISCUSSION    Participants: Dr. Arden Wellington PGY-1  Jake Edward PGY-2  Lianne Arnold [facility]  Progress: Has began to improve having more of her thoughts based in reality with less confabulation.   Continued Stay Criteria/Rationale: Under commitment and Rob[place]o order continues to require stabilization in order to return home.  Medical/Physical: see psychiatry physician progress note for further details   Precautions:   Behavioral Orders   Procedures    Code 1 - Restrict to Unit    Elopement precautions    Routine Programming     As clinically indicated    Self Injury Precaution    Status 15     Every 15 minutes.    Suicide precautions     Patients on Suicide Precautions should have a Combination Diet ordered that includes a Diet selection(s) AND a Behavioral Tray selection for Safe Tray - with utensils, or Safe Tray - NO utensils       Plan: Patient will be continued to be monitored as she has started the COLE of Haldol to improve compliance with medication upon discharge.   Rationale for change in precautions or plan: No change has been indicated.

## 2019-03-28 NOTE — PROGRESS NOTES
Pt was active in the milieu spending time in the lounge, dining room, and resting in their room.  Pt was socail with others, even making a few jokes.  PT ate dinner and snack.  Pt followed staff directions during behavioral emergencies.         03/27/19 3745   Behavioral Health   Hallucinations denies / not responding to hallucinations   Orientation person: oriented;place: oriented;date: oriented;time: oriented   Insight poor   Judgement impaired   Affect full range affect   Mood mood is calm   Physical Appearance/Attire attire appropriate to age and situation   Hygiene well groomed   Suicidality other (see comments)  (ELENI)   Activity withdrawn   Speech clear;coherent   Psychomotor / Gait balanced;steady   Psycho Education   Type of Intervention 1:1 intervention   Response observes from a distance   Activities of Daily Living   Hygiene/Grooming independent   Oral Hygiene independent   Dress independent   Laundry with supervision   Room Organization independent

## 2019-03-28 NOTE — PROGRESS NOTES
"    ----------------------------------------------------------------------------------------------------------  [facility], [place]   Psychiatric Progress Note  Hospital Day #27     Interim History:   The patient's care was discussed with the treatment team and chart notes were reviewed.    Sleep: 6.5h  Scheduled Medications: Taken as prescribed  PRNs: None  Staff Report: No acute events overnight. Interactive with peers, making jokes, eating all meals.    Patient Interview: Patient was interviewed in the patient's room on Station 22. She denied medication side effects. She reported she slept well. She stated that she had worked with [employer]anna to find housing options for her after discharge. She is aware that she cannot renew her lease, but can return to her apartment for the month of April. She stated that she has lined up options to tour other apartments with Bryanna. She had no further questions or concerns.    The risks, benefits, alternatives and side effects of any medication changes have been discussed and are understood by the patient and other caregivers.    Review of systems:     ROS was negative unless noted above.          Allergies:     Allergies   Allergen Reactions     Codeine Anaphylaxis     Morphine Anaphylaxis     Cardiac arrest     Darvocet [Propoxyphene N-Apap] Other (See Comments)     Affects kidneys     Divalproex Sodium-Fd&C Red #40 Hives and Swelling     Lithium Hives and Swelling     Penicillins Hives     Phenytoin Hives     Sulfa Drugs Hives     Also increases liver function     Topiramate Rash and Swelling            Psychiatric Examination:   /70   Pulse 68   Temp 98.4  F (36.9  C) (Oral)   Resp 14   Wt 103.9 kg (229 lb)   SpO2 97%   BMI 32.86 kg/m    Weight is 229 lbs 0 oz  Body mass index is 32.86 kg/m .    Appearance:  awake, alert and dressed in hospital scrubs  Attitude:  cooperative  Eye Contact:  good  Mood:  \"OK\"  Affect:  mood congruent, less " constricted  Speech:  clear, coherent  Psychomotor Behavior:  No evidence of dystonia or tics, some evidence of possible TD (lip smacking, may be secondary to not having lower dentures)  Thought Process:  Logical, linear, and goal oriented  Associations:  no loose associations  Thought Content:  no evidence of suicidal ideation or homicidal ideation and no overt symptoms of psychosis  Insight:  fair  Judgment:  fair  Oriented to:  time, person, and place  Attention Span and Concentration:  intact  Recent and Remote Memory:  intact  Language: speaks fluent English  Fund of Knowledge: appropriate  Muscle Strength and Tone: normal  Gait and Station: Normal         Labs:   No results found for this or any previous visit (from the past 24 hour(s)).       Assessment    Diagnostic Impression: Tosha Mcnair is a 39 year old female with a history of bipolar affective disorder, autism, unspecified anxiety, and mild intellectual disability who presented on health and  hold to outside ED with concern from [facility]  and [facility] crisis for paranoia, delusions, disorganized behavior, and inability to care for self in the context of likely medication nonadherence. Biological factors include medication nonadherance. Psychological factors include previous diagnoses of mood and neurodevelopmental disorders. Social factors include housing instability. The MSE on admission was notable for a malodorous, disheveled woman who endorses grandiose and paranoid delusions and demonstrates a disorganized, illogical thought process. The patient's presentation is most consistent at this time with a primary thought disorder, likely schizophrenia. There is continued concern about primary mood component with psychotic features, given her historical diagnosis of bipolar I disorder.  It is also possible the patient's PTA phentermine may have contributed to her decompensation, however medication adherence prior to  "the hospitalization is questionable. More collateral information is needed to delineate between these diagnoses.    Hospital course: Tosha Mcnair was admitted to station 22 with Dr. Stafford on a 72 hour hold. Her history indicated decline in function since expiration of commitment in February 2018.  Paperwork for commitment and [place] was filed 3/4 given concern for inability to care for self outside of hospital at this time.  PTA lamotrigine was held given evidence of poor adherence.  Risperidone was initiated to target psychosis and denisse, however she declined this medication throughout the hospital stay. Her thought processes remained severely impaired with oppositional behavior and delusional confabulated rationales for her treatment refusal (claimed that other doctors had rendered opposite opinions that she would follow, treatment order was \"in violation of [place] Law # [id number]...\" etc.) Quetiapine was started to target symptoms of denisse and psychosis. She was later cross-titrated to Haldol to target her symptoms with improvement in thought process on a dose of 5 mg daily. She gained insight and said she had been \"out of sorts\" at the time of admission. Haloperidol COLE (decanoate) was recommended by the treatment team, which the patient initially declined but later agreed to. She was started on a COLE loading schedule of Haldol Decanoate 100mg IM for the first 4 weeks, followed by 75mg IM for 4 weeks, and finally 50mg IM every 4weeks as a final ongoing dose. She showed improvement in psychotic thought process after receiving her first Haldol Decanoate injection.    Discontinued Medications (& Rationale):  - Risperidone, as the patient declined to take this medication  - Quetiapine, favoring Haldol    Medical course Patient was cleared medically  by the ED physician for inpatient psychiatry admission. UTox negative. CBC, CMP, lipids, TSH, Vitamin D grossly wnl. Lamotrigine level undetectable on " admission. IM was consulted for right ear pain, and she was started on ciprodex drops for otitis externa.    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      Medical diagnoses to be addressed this admission:    # Otitis externa  - Ciprodex ear drops BID for 7 days, last dose today  - IM consulted for follow-up evaluation while hospitalized    # Asthma  - Continue PTA albuterol prn      # Hyperlipidemia, by history  Low HDL of 40, otherwise unremarkable.      # Hepatitis C carrier, by history  On review of chart, HCV antibody negative in 2017 at [facility].      # GI/FEN  - Mylanta PRN for indigestion  - Milk of Magnesia PRN for constipation    Data: As above  Consults: Nutrition    Legal Status: [place]  Committed    Safety Assessment:   Behavioral Orders   Procedures     Code 1 - Restrict to Unit     Elopement precautions     Routine Programming     As clinically indicated     Self Injury Precaution     Status 15     Every 15 minutes.     Suicide precautions     Patients on Suicide Precautions should have a Combination Diet ordered that includes a Diet selection(s) AND a Behavioral Tray selection for Safe Tray - with utensils, or Safe Tray - NO utensils         Disposition: Unknown at this time, pending clinical stabilization and formulation  of an appropriate treatment plan    This documentation accurately reflects the services I personally performed and treatment decisions made by me in consultation with the attending physician Arden Stafford MD.    Wilmer Wellington MD  Psychiatry PGY-1  [phone]    Psychiatry Attending Attestation:  This patient has been seen and evaluated by me, Arden Stafford M.D.  The patient's condition and treatment plan were discussed with the resident, and care coordinated with the CTC and RN. I reviewed, edited and agree with the findings and plan in this note.     Arden Stafford M.D.   of Psychiatry

## 2019-03-29 PROCEDURE — 12400001 ZZH R&B MH UMMC

## 2019-03-29 PROCEDURE — 99232 SBSQ HOSP IP/OBS MODERATE 35: CPT | Mod: GC | Performed by: PSYCHIATRY & NEUROLOGY

## 2019-03-29 ASSESSMENT — ACTIVITIES OF DAILY LIVING (ADL)
DRESS: INDEPENDENT
HYGIENE/GROOMING: INDEPENDENT
LAUNDRY: WITH SUPERVISION
ORAL_HYGIENE: INDEPENDENT
HYGIENE/GROOMING: INDEPENDENT
DRESS: INDEPENDENT
ORAL_HYGIENE: INDEPENDENT
LAUNDRY: UNABLE TO COMPLETE

## 2019-03-29 NOTE — PLAN OF CARE
"Tosha active on unit this evening-amanda initiates conversation with others, primarily staff-states she is pleased with new outpt care team except that they want her to change primary care provider for medical care-states current primary care MD has been her physician since birth, went on to say she just returned from [place]-when asked if MD was in [employer] or service organization responded, \"[employer] \"-when RN questioned being in [employer] and being primary care MD, Tosha responded, \"Six months [employer], six months being a doctor. She's been doing it for years.\"-Tosha feeling optimistic about move to new apartment, although would prefer return to previous apartment-pleased with new outpt tx team-amanda smiling throughout the day-was observed gesturing with hands-when asked about this responded, \"Remember, I told you before, I'm dispensing energy.\"   "

## 2019-03-29 NOTE — DISCHARGE INSTRUCTIONS
Behavioral Discharge Planning and Instructions      Summary:  You were admitted on 3/1/2019  due to Disorganized Thinking/Behaviors and Psychotic Symptomology.  You were treated by Dr. Jaylan Jane MD and Dr. Arden Stafford MD and discharged on 4/1/19 from [facility] to Home. You are being discharged on a Provisional Discharge through [facility] you will need to remain compliant with this agreement in order to remain in the community without rehospitalization.     Principal Diagnosis: Unspecified Schizophrenia Spectrum and Other Psychotic Disorder    Health Care Follow-up Appointments:     [facility] ACT Team- Tuesday April 2nd 1:30pm   Nida Hernandez MS, [facility]  Cell: [phone]  Fax:  [phone]    Attend all scheduled appointments with your outpatient providers. Call at least 24 hours in advance if you need to reschedule an appointment to ensure continued access to your outpatient providers.   Major Treatments, Procedures and Findings:  You were provided with: a psychiatric assessment, medication evaluation and/or management and milieu management    Symptoms to Report: increased confusion, losing more sleep or mood getting worse    Early warning signs can include: increased depression or anxiety sleep disturbances increased thoughts or behaviors of suicide or self-harm  increased unusual thinking, such as paranoia or hearing voices    Safety and Wellness:  Take all medicines as directed.  Make no changes unless your doctor suggests them.      Follow treatment recommendations.  Refrain from alcohol and non-prescribed drugs.  If there is a concern for safety, call [phone].    Resources:   Crisis Intervention: 721.102.3061 or 885-818-8897 (TTY: 905.237.1912).  Call anytime for help.  National [place] on Mental Illness (www.[place].syed.org): 653.525.2822 or 555-997-4948.  Suicide Awareness Voices of Education (SAVE) (www.save.org): 048-690-MZLH (8536)  National Suicide Prevention Line (www.mentalhealthmn.org): 864-691-IEBK  "(8279)  Mental Health Consumer/Survivor Network of [place] (www.mhcsn.net): 306.104.9919 or 251-139-4096  Mental Health Association of [place] (www.mentalhealth.org): 258.553.4574 or 313-138-9329  Self- Management and Recovery Training., SMART-- Toll free: 632.851.1578  www.Know[employer]  [facility] Crisis Response 503-584-1320  Text 4 Life: txt \"LIFE\" to 03955 for immediate support and crisis intervention  Crisis text line: Text \"[place]\" to 314233. Free, confidential, 24/7.  Crisis Intervention: 602.720.6873 or 944-035-9361. Call anytime for help.       The treatment team has appreciated the opportunity to work with you.     If you have any questions or concerns our unit number is [phone]        "

## 2019-03-29 NOTE — PROGRESS NOTES
"    ----------------------------------------------------------------------------------------------------------  [facility], [place]   Psychiatric Progress Note  Hospital Day #28     Interim History:   The patient's care was discussed with the treatment team and chart notes were reviewed.    Sleep: 6.75h  Scheduled Medications: Taken as prescribed  PRNs: None  Staff Report: No acute events overnight. Told nursing that her current primary care MD just returned from [facility] and was \"[facility] ,\" RN questioned being in Special OP and being primary care MD and Tosha responded \"Six months Specilal Ops, six months being a doctor. She's been doing it for years.\"-    Patient Interview: Patient was interviewed in the patient's room on [facility]. Tosha stated she slept well. She denied medication side effects.  She spoke with multiple people last night to solidify her housing situation. She stated initially that she had made appointments to tour multiple housing locations. She was interested in discharging as soon as possible so that she could go clean her home. She agreed to participating in the AIMS exam. She was counseled on the results of this exam, specifically that she meets criteria for TD. We discussed the underlying reason that she developed TD, being the use of antipsychotics over time, and discussed the benefits of ongoing antipsychotic use. We counseled Tosha that her treatment team believes the benefits of continuing her antipsychotic out weight the risks of discontinuing her Haldol Decanoate medication in effort to reduce her risk for her TD progressing, as we believe she is at a high risk of developing future psychotic episodes. She expressed understanding and was in agreement with this plan to continue her antipsychotic medication.     Later in the morning, we discussed with her that she indeed did not have tours set up for touring different apartments per her " ". She stated that she had misspoke, and that she had the numbers set up but had not made the appointments yet. Tosha was agreeable to remain in the hospital for further observation. She had no additional concerns or questions at this time.    The risks, benefits, alternatives and side effects of any medication changes have been discussed and are understood by the patient and other caregivers.    Review of systems:     ROS was negative unless noted above.          Allergies:     Allergies   Allergen Reactions     Codeine Anaphylaxis     Morphine Anaphylaxis     Cardiac arrest     Darvocet [Propoxyphene N-Apap] Other (See Comments)     Affects kidneys     Divalproex Sodium-Fd&C Red #40 Hives and Swelling     Lithium Hives and Swelling     Penicillins Hives     Phenytoin Hives     Sulfa Drugs Hives     Also increases liver function     Topiramate Rash and Swelling            Psychiatric Examination:   /60   Pulse 60   Temp 98.7  F (37.1  C) (Oral)   Resp 12   Wt 103.9 kg (229 lb)   SpO2 99%   BMI 32.86 kg/m    Weight is 229 lbs 0 oz  Body mass index is 32.86 kg/m .    Appearance:  awake, alert and dressed in hospital scrubs  Attitude:  cooperative  Eye Contact:  good  Mood:  \"Feeling good\"  Affect:  mood congruent, less constricted  Speech:  clear, coherent  Psychomotor Behavior:  evidence of tardive dyskinesia and AIMS completed as noted in Interval Hx.  Thought Process:  asserts firmly various events and conversations that didn't happen, e.g. her PCP was in [employer], she made housing appointments, etc. unclear if patient is delusional or fabricating   Associations:  no loose associations  Thought Content:  no evidence of suicidal ideation or homicidal ideation and no overt symptoms of psychosis, false statements still frequent, unclear if fixed belief or conscious  Insight:  fair  Judgment:  fair  Oriented to:  time, person, and place  Attention Span and Concentration:  intact  Recent and " Remote Memory:  intact  Language: speaks fluent English  Fund of Knowledge: appropriate  Muscle Strength and Tone: normal  Gait and Station: Normal         Labs:   No results found for this or any previous visit (from the past 24 hour(s)).       Assessment    Diagnostic Impression: Tosha Mcnair is a 39 year old female with a history of bipolar affective disorder, autism, unspecified anxiety, and mild intellectual disability who presented on health and  hold to outside ED with concern from [facility]  and [facility] crisis for paranoia, delusions, disorganized behavior, and inability to care for self in the context of likely medication nonadherence. Biological factors include medication nonadherance. Psychological factors include previous diagnoses of mood and neurodevelopmental disorders. Social factors include housing instability. The MSE on admission was notable for a malodorous, disheveled woman who endorses grandiose and paranoid delusions and demonstrates a disorganized, illogical thought process. The patient's presentation is most consistent at this time with a primary thought disorder, likely schizophrenia. There is continued concern about primary mood component with psychotic features, given her historical diagnosis of bipolar I disorder.  It is also possible the patient's PTA phentermine may have contributed to her decompensation, however medication adherence prior to the hospitalization is questionable. More collateral information is needed to delineate between these diagnoses.    Hospital course: Tosha Mcnair was admitted to station 22 with Dr. Stafford on a 72 hour hold. Her history indicated decline in function since expiration of commitment in February 2018.  Paperwork for commitment and [place] was filed 3/4 given concern for inability to care for self outside of hospital at this time.  PTA lamotrigine was held given evidence of poor adherence.  Risperidone was initiated  "to target psychosis and denisse, however she declined this medication throughout the hospital stay. Her thought processes remained severely impaired with oppositional behavior and delusional confabulated rationales for her treatment refusal (claimed that other doctors had rendered opposite opinions that she would follow, treatment order was \"in violation of [place] Law # [id number]...\" etc.) Quetiapine was started to target symptoms of denisse and psychosis. She was later cross-titrated to Haldol to target her symptoms with improvement in thought process on a dose of 5 mg daily. She gained insight and said she had been \"out of sorts\" at the time of admission. Haloperidol COLE (decanoate) was recommended by the treatment team, which the patient initially declined but later agreed to. She was started on a COLE loading schedule of Haldol Decanoate 100mg IM for the first 4 weeks, followed by 75mg IM for 4 weeks, and finally 50mg IM every 4weeks as a final ongoing dose. She showed improvement in psychotic thought process after receiving her first Haldol Decanoate injection. AIMS was performed, and she scored 5 points in questions 1-7, meeting criteria for tardive dyskinesia.     Discontinued Medications (& Rationale):  - Risperidone, as the patient declined to take this medication  - Quetiapine, favoring Haldol    Medical course Patient was cleared medically  by the ED physician for inpatient psychiatry admission. UTox negative. CBC, CMP, lipids, TSH, Vitamin D grossly wnl. Lamotrigine level undetectable on admission. IM was consulted for right ear pain, and she was started on ciprodex drops for otitis externa, which resolved her symptoms.    Plan   Principal Diagnosis:   # Unspecified Schizophrenia Spectrum and Other Psychotic Disorder  # R/o Schizophrenia, multiple episodes  # R/o Bipolar I disorder, current episode denisse, with psychotic features     Secondary psychiatric diagnoses of concern this admission:   # " Tardive dyskinesia   # Unspecified anxiety by history  # Autism by history  # Mild intellectual disability (IQ 50-70) by history    Psychotropic Medications:  Modify:    Continue:  - Haldol Decanoate 100mg [id number] as a loading dose  - Olanzapine 10 mg PO/IM Q2H PRN for agitation  - Hydroxyzine 25-50 Q6H PRN for anxiety  - Melatonin 3 mg at bedtime for sleep    Patient will be treated in therapeutic milieu with appropriate individual and group therapies as described.      Medical diagnoses to be addressed this admission:    # Otitis externa (resolved)    # Asthma  - Continue PTA albuterol prn      # Hyperlipidemia, by history  Low HDL of 40, otherwise unremarkable.      # Hepatitis C carrier, by history  On review of chart, HCV antibody negative in 2017 at [facility] [place].      # GI/FEN  - Mylanta PRN for indigestion  - Milk of Magnesia PRN for constipation    Data: As above  Consults: Nutrition    Legal Status: [place]  Committed    Safety Assessment:   Behavioral Orders   Procedures     Code 1 - Restrict to Unit     Elopement precautions     Routine Programming     As clinically indicated     Self Injury Precaution     Status 15     Every 15 minutes.     Suicide precautions     Patients on Suicide Precautions should have a Combination Diet ordered that includes a Diet selection(s) AND a Behavioral Tray selection for Safe Tray - with utensils, or Safe Tray - NO utensils         Disposition: Unknown at this time, pending clinical stabilization and formulation of an appropriate treatment plan    This documentation accurately reflects the services I personally performed and treatment decisions made by me in consultation with the attending physician Arden Stafford MD.    Wilmer Wellington MD  Psychiatry PGY-1  [phone]    Psychiatry Attending Attestation:  This patient has been seen and evaluated by me, Arden Stafford M.D.  The patient's condition and treatment plan were discussed with the resident, and care coordinated  with the CTC and RN. I reviewed, edited and agree with the findings and plan in this note.     Arden Stafford M.D.   of Psychiatry

## 2019-03-29 NOTE — PROGRESS NOTES
03/29/19 1500   Behavioral Health   Hallucinations other (see comment)  (Unable to assess)   Thinking poor concentration   Orientation person: oriented;place: oriented;date: oriented;time: oriented   Memory baseline memory   Insight poor   Judgement impaired   Eye Contact at examiner   Affect blunted, flat   Mood mood is calm   Physical Appearance/Attire attire appropriate to age and situation;neat   Hygiene well groomed   Suicidality other (see comments)  (Unable to assess)   Self Injury other (see comment)  (Unable to assess)        The patient was visible in milieu, cooperative with staff and most of the time the patient was reading in room. Patient didn't participated any groups actives or socialized any peers. Patient watched TV little bit and pacing  Hallway. Overall, the ate breakfast and lunch 80 percent. Overall, the patient mood was calm.

## 2019-03-29 NOTE — PROGRESS NOTES
03/29/19 1328   Behavioral Health   Thoughts/Cognition (WDL) WDL   Hallucinations denies / not responding to hallucinations   Thinking poor concentration   Orientation person: oriented;place: oriented;date: oriented;time: oriented   Memory baseline memory   Insight poor   Judgement impaired   Eye Contact at examiner   Affect/Mood (WDL) WDL   Affect blunted, flat   Mood mood is calm   ADL Assessment (WDL) WDL   Physical Appearance/Attire attire appropriate to age and situation   Hygiene other (see comment)  (Patient did not shower during this shift)   Suicidality (WDL) WDL   Suicidality other (see comments)  (Patient denies)   1. Wish to be Dead No   2. Non-Specific Active Suicidal Thoughts  No   Activities of Daily Living   Hygiene/Grooming independent   Oral Hygiene independent   Dress independent   Laundry with supervision   Room Organization independent       Patient was calm for the entirety of the shift. Patient was visible out in the milieu but was not social with other patients. Patient spent majority of the day in her room. Patient did not attend any of the groups offered. Patient reports that she is discharging on Monday and will be going home. Patient denies SI/SIB. Patient has no concerns at this time.

## 2019-03-30 PROCEDURE — 25000132 ZZH RX MED GY IP 250 OP 250 PS 637: Performed by: STUDENT IN AN ORGANIZED HEALTH CARE EDUCATION/TRAINING PROGRAM

## 2019-03-30 PROCEDURE — 12400001 ZZH R&B MH UMMC

## 2019-03-30 RX ADMIN — ACETAMINOPHEN 650 MG: 325 TABLET, FILM COATED ORAL at 19:14

## 2019-03-30 ASSESSMENT — ACTIVITIES OF DAILY LIVING (ADL)
DRESS: INDEPENDENT
HYGIENE/GROOMING: INDEPENDENT
ORAL_HYGIENE: INDEPENDENT

## 2019-03-30 NOTE — PROGRESS NOTES
"Patient has been largely withdrawn throughout the shift. Minimal answers during check-in. States that her mood is \"fine\" and denies AVH/SI/SIB/med side effects.       03/30/19 1454   Behavioral Health   Hallucinations denies / not responding to hallucinations   Thinking intact   Orientation person: oriented;place: oriented   Memory baseline memory   Insight other (see comment)  (ELENI patient minimally engaged in conversation)   Judgement (ELENI patient minimally engaged in conversation)   Eye Contact into space   Affect blunted, flat   Mood mood is calm   Physical Appearance/Attire attire appropriate to age and situation   Hygiene well groomed   Suicidality (denies)   1. Wish to be Dead No   2. Non-Specific Active Suicidal Thoughts  No   Self Injury other (see comment)  (no statements or behaviors)   Elopement (no statements or behaviors)   Activity withdrawn;isolative   Speech coherent;clear   Medication Sensitivity no observed side effects;no stated side effects   Psychomotor / Gait balanced;steady   Activities of Daily Living   Hygiene/Grooming independent   Oral Hygiene independent   Dress independent   Room Organization independent     Andrew Salinas on 3/30/2019 at 2:59 PM    "

## 2019-03-30 NOTE — PROGRESS NOTES
Pt was withdrawn most of the evening, napping and spending time in her room. During check in pt gave brief, clipped responses to all questions, denying all mental health symptoms stating 'I'm leaving Monday'. Overall pt is calm and appropriate in the milieu, no concerns to be noted this shift.       03/29/19 2135   Behavioral Health   Hallucinations denies / not responding to hallucinations   Thinking poor concentration   Orientation person: oriented;place: oriented;date: oriented;time: oriented   Memory baseline memory   Insight poor   Judgement impaired   Eye Contact at examiner   Affect blunted, flat   Mood mood is calm   Physical Appearance/Attire attire appropriate to age and situation   Hygiene well groomed   Suicidality other (see comments)  (denies)   1. Wish to be Dead No   2. Non-Specific Active Suicidal Thoughts  No   Self Injury other (see comment)  (denies)   Elopement Statements about wanting to leave   Activity withdrawn;isolative   Speech clear;coherent   Psychomotor / Gait balanced;steady   Psycho Education   Type of Intervention 1:1 intervention   Response participates, initiates socially appropriate   Hours 0.5   Treatment Detail check in   Activities of Daily Living   Hygiene/Grooming independent   Oral Hygiene independent   Dress independent   Laundry unable to complete   Room Organization independent

## 2019-03-31 PROCEDURE — 12400001 ZZH R&B MH UMMC

## 2019-03-31 ASSESSMENT — ACTIVITIES OF DAILY LIVING (ADL)
HYGIENE/GROOMING: INDEPENDENT
LAUNDRY: WITH SUPERVISION
ORAL_HYGIENE: INDEPENDENT
ORAL_HYGIENE: INDEPENDENT
HYGIENE/GROOMING: INDEPENDENT
DRESS: INDEPENDENT
LAUNDRY: WITH SUPERVISION
DRESS: INDEPENDENT

## 2019-03-31 NOTE — PROGRESS NOTES
Pt was observed to sleep on and off in their room throughout the entire evening. When out of their room for meals and briefly watching TV pt was minimally social giving short answers to most social interaction from peers and staff. Pt was observed to make repetitive hand movements when out of their room. Pt denies SI and SIB as well as hallucinations.      03/30/19 2035   Behavioral Health   Hallucinations denies / not responding to hallucinations   Thinking intact   Orientation person: oriented;place: oriented   Memory baseline memory   Insight poor   Judgement impaired   Eye Contact at examiner   Affect blunted, flat   Mood mood is calm   Physical Appearance/Attire attire appropriate to age and situation   Suicidality (Pt denies)   1. Wish to be Dead No   2. Non-Specific Active Suicidal Thoughts  No   Self Injury (None stated or observed)   Elopement (None stated or observed)   Activity withdrawn;isolative   Speech clear;coherent   Medication Sensitivity no stated side effects;no observed side effects   Psychomotor / Gait steady;balanced   Psycho Education   Type of Intervention 1:1 intervention   Response participates with encouragement   Hours 0.5   Treatment Detail Check-in

## 2019-04-01 VITALS
WEIGHT: 227 LBS | DIASTOLIC BLOOD PRESSURE: 60 MMHG | BODY MASS INDEX: 32.57 KG/M2 | TEMPERATURE: 98.8 F | RESPIRATION RATE: 12 BRPM | HEART RATE: 71 BPM | SYSTOLIC BLOOD PRESSURE: 96 MMHG | OXYGEN SATURATION: 97 %

## 2019-04-01 PROCEDURE — 36415 COLL VENOUS BLD VENIPUNCTURE: CPT | Performed by: STUDENT IN AN ORGANIZED HEALTH CARE EDUCATION/TRAINING PROGRAM

## 2019-04-01 PROCEDURE — 80173 ASSAY OF HALOPERIDOL: CPT | Performed by: STUDENT IN AN ORGANIZED HEALTH CARE EDUCATION/TRAINING PROGRAM

## 2019-04-01 PROCEDURE — 99238 HOSP IP/OBS DSCHRG MGMT 30/<: CPT | Mod: GC | Performed by: PSYCHIATRY & NEUROLOGY

## 2019-04-01 RX ORDER — HALOPERIDOL DECANOATE 100 MG/ML
75 INJECTION INTRAMUSCULAR
Qty: 0.75 ML | Refills: 0 | Status: SHIPPED | OUTPATIENT
Start: 2019-04-26 | End: 2019-04-27

## 2019-04-01 NOTE — PLAN OF CARE
Tosha discharged 1400 via taxi to home-reviewed plan for Haldol Dec via ACT team and outpt appts-Tosha verbalized understanding and intention to follow tx plan-has printed instructions in her possession

## 2019-04-01 NOTE — DISCHARGE SUMMARY
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 Never Used   Substance and Sexual Activity     Alcohol use: Yes     Alcohol/week: 1.8 oz     Types: 3 Glasses of wine per week     Drug use: No     Sexual activity: Yes     Partners: Male   Lifestyle     Physical activity:     Days per week: Not on file     Minutes per session: Not on file     Stress: Not on file   Relationships     Social connections:     Talks on phone: Not on file     Gets together: Not on file     Attends Hoah[employer] service: Not on file     Active member of club or organization: Not on file     Attends meetings of clubs or organizations: Not on file     Relationship status: Not on file     Intimate partner violence:     Fear of current or ex partner: Not on file     Emotionally abused: Not on file     Physically abused: Not on file     Forced sexual activity: Not on file   Other Topics Concern     Not on file   Social History Narrative     Not on file        Family History:     Family History   Problem Relation Age of Onset     Hypertension Father      Diabetes Father       Gastrointestinal Disease Sister         crohns disease        Reviewed & updated in Epic.     Review of Systems:   Ten point review of systems negative except as stated above in   History of Present Illness.    OBJECTIVE   Physical Exam:   Blood pressure 118/75, pulse 79, temperature 98  F (36.7  C),   temperature source Oral, resp. rate 16, weight 102.7 kg (226 lb 8   oz), SpO2 96 %, not currently breastfeeding.  General: Alert, awake, and in NAD. Non-toxic appearing.  HEENT: NC/AT. Anicteric sclera. Eyes symmetric and free of   discharge bilaterally. Mucous membranes moist. LEFT EAR: external   auditory canals and tympanic membranes without erythema or edema.   RIGHT EAR: erythema and edema of auditory canal, TM visualized   and transparent, no air fluid level appreciated. Mild tenderness   of anterior ear with palpation.   Neck: Supple  Cardiovascular: RRR. S1,S2. No murmurs appreciated.  Lungs: Normal respiratory effort on RA. Lungs CTAB without   wheezes, rales, or rhonchi.  Abdomen: Soft, non-tender, and nondistended with normoactive   bowel sounds.  Extremities: Warm & well perfused. No peripheral edema.  Skin: No rashes, jaundice, or lesions on exposed areas of skin.  Neurologic: A&O X 3. Answers questions appropriately. Moves all   extremities symmetrically.     Laboratory Data:   CMPNo lab results found in last 7 days.    CBCNo lab results found in last 7 days.    TSHNo lab results found in last 7 days.       Assessment and Plan/Recommendations:   Tosha Mcnair is a 39 year old woman with history of bipolar   disorder, asthma, anxiety disorder, intellectual disability (IQ   50-70), autism admitted to [facility] inpatient mental health   for disorganized behaviors, paranoia, delusional thoughts content   in the context of likely medication non adherence. Medicine was   asked to see patient due to ear pain.     #Bipolar affective disorder   #Anxiety  #Autism   #intellectual disability   - Management per  "psychiatry     #Otitis externa, right ear   - Irrigate right ear with warm water prior to initiation of   antibiotic ear drops   - Ciprodex ear drops 4 drops right ear BID X 7 days   - Tylenol PRN for pain   - Follow up with PCP within on week of completion of abx to   evaluate for resolution   - If patient remains inpatient following completion of abx please   notify medicine to re-evaluate right ear for resolution     #Asthma, mild persistent PTA on albuterol PRN. Previously on   montelukast and fluticasone however these were discontinued in   2018. No evidence of exacerbation at this time.   - Continue albuterol PRN     Medicine will sign off. Please page the Internal Medicine job   code pager for any intercurrent medical issues which arise. Thank   you for the opportunity to be a part of this patient's care.    Agnes Dubose PA-C  Hospitalist Service  [phone]         Internal Medicine Adult IP Consult for [facility] in [facility]: Patient to be seen: Routine within 24 hrs; Call back #: [phone]; Re-check right ear followinc treatment for otitis externa; Consultant may enter orders: Yes; Requesting pro...    Narrative    Maida Delgadillo PA-C     3/28/2019  2:33 PM  Brief Medicine Consult Note    Contacted by Psychiatry regarding follow-up of otitis externa.     HPI:  Today's vital signs, medications, and nursing notes were   reviewed.   Patient was evaluated by Medicine on 3/21 for c/o 2 days of right   ear pain. Exam and history were most consistent with otitis   externa. She was started on a 7-day course of Ciprodex ear drops   QID, which she completed this morning.     Patient reports resolution of her right ear pain and is feeling   \"much better.\" She denies any hearing difficulties, drainage, or   itching of the right ear. No left ear complaints. No   fever/chills, headaches, cold symptoms. She notes she uses Q-tips   regularly at home to clean her ears.     /70   Pulse 68   Temp " 98.1  F (36.7  C) (Oral)   Resp 12     Wt 103.9 kg (229 lb)   SpO2 98%   BMI 32.86 kg/m    General: Alert and oriented. In no apparent distress.   Right ear: External auditory canal is clear with no erythema,   edema, or discharge. TM fully visualized, pearly gray with good   light reflex, non-erythematous, no air fluid level. External ear   is entirely non-tender to palpation.   Left ear: External auditory canal is clear with no erythema,   edema, or discharge. TM partially obscured by cerumen but no   visible air fluid level.   Resp: Breathing comfortably on room air.       A/P:  Right otitis externa, resolved: She has completed an adequate   course of Ciprodex drops. Discussed Q-tip use, encouraged her to   use other methods to clean her ears. No indications for further   treatment or follow-up.       Medicine will sign-off. Please page if new concerns or questions   arise.     Maida Delgadillo PA-C  Hospitalist Service  Pager: [phone]

## 2019-04-01 NOTE — PLAN OF CARE
"Tosha spent most of day and evening in her room-did approach staff to discuss discharge on Monday-refused activities on unit-small amt of time in lounge this evening, but was not observed interacting with others-states she is feeling tired and does freq appear to be sleeping-continues to \"release energy\" by moving arms and wrists as though throwing something through the air  "

## 2019-04-01 NOTE — PROGRESS NOTES
Patient signed provisional discharge, writer sent it in to the [facility] team and placed copy in chart.

## 2019-04-04 ENCOUNTER — TELEPHONE (OUTPATIENT)
Dept: FAMILY MEDICINE | Facility: CLINIC | Age: 39
End: 2019-04-04

## 2019-04-04 NOTE — TELEPHONE ENCOUNTER
In hospital x 1 month.  ?? More appropriate for care coordination.  If not, send back and I will call tomorrow.  Malathi Carreno RN

## 2019-04-05 ENCOUNTER — PATIENT OUTREACH (OUTPATIENT)
Dept: CARE COORDINATION | Facility: CLINIC | Age: 39
End: 2019-04-05

## 2019-04-05 NOTE — PROGRESS NOTES
Clinic Care Coordination Contact  [facility]/Voicemail    Referral Source: Care Team  Clinical Data: Care Coordinator Outreach  Outreach attempted x 1.  Left message on voicemail with call back information and requested return call.  Plan: Care Coordinator will mail out care coordination introduction letter with care coordinator contact information and explanation of care coordination services. Care Coordinator will try to reach patient again in 3-5 business days.      ALEJA Valencia   Care Coordination Team  [phone]

## 2019-04-05 NOTE — LETTER
[place] CARE COORDINATION  [facility]   April 5, 2019    Tosha Mcnair  [address] [address]      Dear Tosha,    I am a clinic care coordinator who works with Lady Garcia MD at [facility] . I have been trying to reach you recently to introduce Clinic Care Coordination and to see if there was anything I could assist you with.  I wanted to introduce myself and provide you with my contact information so that you can call me with questions or concerns about your health care. Below is a description of clinic care coordination and how I can further assist you.     The clinic care coordinator is a registered nurse and/or  who understand the health care system. The goal of clinic care coordination is to help you manage your health and improve access to the [facility] system in the most efficient manner. The registered nurse can assist you in meeting your health care goals by providing education, coordinating services, and strengthening the communication among your providers. The  can assist you with financial, behavioral, psychosocial, chemical dependency, counseling, and/or psychiatric resources.    Please feel free to contact me at [phone], with any questions or concerns. We at [facility] are focused on providing you with the highest-quality healthcare experience possible and that all starts with you.     Sincerely,     Virginia Chun    Enclosed: Care Coordination Flyer

## 2019-04-19 NOTE — PROGRESS NOTES
Clinic Care Coordination Contact  [facility]/Voicemail    Referral Source: Care Team  Clinical Data: Care Coordinator Outreach  Outreach attempted x 2.  Unable to leave  message on voicemail.l.  Plan: Care Coordinator mailed out care coordination introduction letter on 4/5/19.. Care Coordinator will do no further outreaches at this time.      ALEJA Valencia   Care Coordination Team  [phone]

## 2019-04-24 LAB — HALOPERIDOL SERPL-MCNC: NORMAL NG/ML (ref 1–40)

## 2020-03-10 ENCOUNTER — HEALTH MAINTENANCE LETTER (OUTPATIENT)
Age: 40
End: 2020-03-10

## 2020-12-27 ENCOUNTER — HEALTH MAINTENANCE LETTER (OUTPATIENT)
Age: 40
End: 2020-12-27

## 2021-04-24 ENCOUNTER — HEALTH MAINTENANCE LETTER (OUTPATIENT)
Age: 41
End: 2021-04-24

## 2021-10-04 ENCOUNTER — HEALTH MAINTENANCE LETTER (OUTPATIENT)
Age: 41
End: 2021-10-04

## 2022-05-15 ENCOUNTER — HEALTH MAINTENANCE LETTER (OUTPATIENT)
Age: 42
End: 2022-05-15

## 2022-09-11 ENCOUNTER — HEALTH MAINTENANCE LETTER (OUTPATIENT)
Age: 42
End: 2022-09-11

## 2023-06-03 ENCOUNTER — HEALTH MAINTENANCE LETTER (OUTPATIENT)
Age: 43
End: 2023-06-03

## 2024-02-24 ENCOUNTER — HEALTH MAINTENANCE LETTER (OUTPATIENT)
Age: 44
End: 2024-02-24